# Patient Record
Sex: FEMALE | Race: BLACK OR AFRICAN AMERICAN | Employment: FULL TIME | URBAN - METROPOLITAN AREA
[De-identification: names, ages, dates, MRNs, and addresses within clinical notes are randomized per-mention and may not be internally consistent; named-entity substitution may affect disease eponyms.]

---

## 2017-06-01 ENCOUNTER — TELEPHONE (OUTPATIENT)
Dept: SURGERY | Age: 37
End: 2017-06-01

## 2017-07-14 ENCOUNTER — TELEPHONE (OUTPATIENT)
Dept: SURGERY | Age: 37
End: 2017-07-14

## 2017-09-12 RX ORDER — CYANOCOBALAMIN 1000 UG/ML
INJECTION, SOLUTION INTRAMUSCULAR; SUBCUTANEOUS
Qty: 10 VIAL | Refills: 2 | Status: SHIPPED | OUTPATIENT
Start: 2017-09-12 | End: 2018-07-20 | Stop reason: ALTCHOICE

## 2017-09-12 RX ORDER — SYRINGE WITH NEEDLE, 1 ML 25GX5/8"
SYRINGE, EMPTY DISPOSABLE MISCELLANEOUS
Qty: 12 SYRINGE | Refills: 1 | Status: SHIPPED | OUTPATIENT
Start: 2017-09-12 | End: 2018-07-20 | Stop reason: SDUPTHER

## 2018-07-20 ENCOUNTER — OFFICE VISIT (OUTPATIENT)
Dept: SURGERY | Age: 38
End: 2018-07-20

## 2018-07-20 VITALS
HEIGHT: 63 IN | HEART RATE: 64 BPM | OXYGEN SATURATION: 98 % | TEMPERATURE: 98.3 F | SYSTOLIC BLOOD PRESSURE: 115 MMHG | BODY MASS INDEX: 34.64 KG/M2 | WEIGHT: 195.5 LBS | RESPIRATION RATE: 20 BRPM | DIASTOLIC BLOOD PRESSURE: 70 MMHG

## 2018-07-20 DIAGNOSIS — Z98.84 S/P LAPAROSCOPIC SLEEVE GASTRECTOMY: ICD-10-CM

## 2018-07-20 DIAGNOSIS — E66.01 MORBID OBESITY (HCC): Primary | ICD-10-CM

## 2018-07-20 DIAGNOSIS — E53.8 VITAMIN B12 DEFICIENCY: ICD-10-CM

## 2018-07-20 DIAGNOSIS — R63.5 WEIGHT GAIN: ICD-10-CM

## 2018-07-20 DIAGNOSIS — E55.9 VITAMIN D DEFICIENCY: ICD-10-CM

## 2018-07-20 RX ORDER — CYANOCOBALAMIN 1000 UG/ML
1000 INJECTION, SOLUTION INTRAMUSCULAR; SUBCUTANEOUS
Qty: 10 ML | Refills: 1 | Status: SHIPPED | OUTPATIENT
Start: 2018-07-20 | End: 2019-10-17 | Stop reason: SDUPTHER

## 2018-07-20 NOTE — PATIENT INSTRUCTIONS
Protein first and at each meal.  Include produce (vegetables and/or fruits) at each meal.    Limit or eliminate \"filler\" foods such as breads, pasta, potatoes, rice, crackers, pretzels, and the like. Avoid eating and drinking together, there just isn't enough room! You may continue protein supplementation if needed to meet your daily protein goals. Many patients use a protein shake or bar to replace a meal.  There are a variety of protein supplements listed in your education book. Take your vitamins every day. Switch to prenatal vitamin with pregnancy  Follow up 6 weeks post partum.

## 2018-07-20 NOTE — MR AVS SNAPSHOT
[address] [facility] [id number] [phone] Patient: Ira Butt MRN: [id number] :1980 Visit Information Date & Time Provider Department Dept. Phone Encounter #  
 2018  9:40 AM Ace Cruz NP [facility] [id number] [phone] [id number] Follow-up Instructions Return in about 1 year (around 2019). Upcoming Health Maintenance Date Due Pneumococcal 19-64 Medium Risk (1 of 1 - PPSV23) 1998 DTaP/Tdap/Td series (1 - Tdap) 2000 PAP AKA CERVICAL CYTOLOGY 2000 Influenza Age 5 to Adult 2018 Allergies as of 2018  Review Complete On: 2018 By: Cortes Quick Severity Noted Reaction Type Reactions Other Medication  2013    Other (comments) HAS HAD YEAST INFECTIONS AFTER CLINDAMYCIN, PENICILLIN Current Immunizations  Never Reviewed No immunizations on file. Not reviewed this visit You Were Diagnosed With   
  
 Codes Comments Morbid obesity ([facility] [address]    -  Primary ICD-10-CM: E66.01 
ICD-9-CM: 278.01 S/P laparoscopic sleeve gastrectomy     ICD-10-CM: P28.69 ICD-9-CM: V45.86 Weight gain     ICD-10-CM: R63.5 ICD-9-CM: 783.1 Vitamin B12 deficiency     ICD-10-CM: E53.8 ICD-9-CM: 266.2 Vitamin D deficiency     ICD-10-CM: E55.9 ICD-9-CM: 268.9 BMI 34.0-34.9,adult     ICD-10-CM: S96.65 
ICD-9-CM: V85.34 Vitals BP Pulse Temp Resp Height(growth percentile) Weight(growth percentile) 115/70 (BP 1 Location: Left arm, BP Patient Position: Sitting) 64 98.3 °F (36.8 °C) (Oral) 20 5' 3\" (1.6 m) 195 lb 8 oz (88.7 kg) SpO2 BMI OB Status Smoking Status 98% 34.63 kg/m2 Having regular periods Never Smoker Vitals History BMI and BSA Data Body Mass Index Body Surface Area  
 34.63 kg/m 2 1.99 m 2 Your Updated Medication List  
  
   
 This list is accurate as of 7/20/18 10:30 AM.  Always use your most recent med list.  
  
  
  
  
 ATIVAN 0.5 mg tablet Generic drug:  LORazepam  
Take  by mouth every four (4) hours as needed. Indications: ANXIETY BD LUER-FRANCIS SYRINGE 3 mL 23 x 1\" Syrg Generic drug:  Syringe with Needle (Disp) USE EVERY MONTH intramuscularly as directed by prescriber Calcium-Cholecalciferol (D3) 600 mg(1,500mg) -400 unit Chew Take  by mouth. cholecalciferol 5,000 unit capsule Commonly known as:  VITAMIN D3 Take 1 Cap by mouth daily. Indications: VITAMIN D DEFICIENCY  
  
 cyanocobalamin 1,000 mcg/mL injection Commonly known as:  VITAMIN B12  
inject 1 milliliter intramuscularly EVERY MONTH  
  
 HAIR,SKIN & NAILS PO Take  by mouth. Natures Bounty-  gummies  
  
 multivitamin,tx-iron-minerals Tab Take  by mouth.  
  
 pantoprazole 40 mg tablet Commonly known as:  PROTONIX Take 1 Tab by mouth daily. We Performed the Following CBC W/O DIFF [06149 CPT(R)] IRON PROFILE [id number] CPT(R)] METABOLIC PANEL, COMPREHENSIVE [05983 CPT(R)] VITAMIN B12 & FOLATE [26305 CPT(R)] VITAMIN B6 [id number] CPT(R)] VITAMIN D, 25 HYDROXY [id number] CPT(R)] Follow-up Instructions Return in about 1 year (around 7/20/2019). Patient Instructions Protein first and at each meal.  Include produce (vegetables and/or fruits) at each meal.   
Limit or eliminate \"filler\" foods such as breads, pasta, potatoes, rice, crackers, pretzels, and the like. Avoid eating and drinking together, there just isn't enough room! You may continue protein supplementation if needed to meet your daily protein goals. Many patients use a protein shake or bar to replace a meal.  There are a variety of protein supplements listed in your education book. Take your vitamins every day. Switch to prenatal vitamin with pregnancy Follow up 6 weeks post partum. Introducing [facility]! [employer] introduces [employer] patient portal. Now you can access parts of your medical record, email your doctor's office, and request medication refills online. 1. In your internet browser, go to [email or web address] 2. Click on the First Time User? Click Here link in the Sign In box. You will see the New Member Sign Up page. 3. Enter your [employer] Access Code exactly as it appears below. You will not need to use this code after youve completed the sign-up process. If you do not sign up before the expiration date, you must request a new code. · [employer] Access Code: [email or web address] Expires: 10/18/2018  9:49 AM 
 
4. Enter the last four digits of your Social Security Number (xxxx) and Date of Birth (mm/dd/yyyy) as indicated and click Submit. You will be taken to the next sign-up page. 5. Create a [employer] ID. This will be your [employer] login ID and cannot be changed, so think of one that is secure and easy to remember. 6. Create a [employer] password. You can change your password at any time. 7. Enter your Password Reset Question and Answer. This can be used at a later time if you forget your password. 8. Enter your e-mail address. You will receive e-mail notification when new information is available in [address] 9. Click Sign Up. You can now view and download portions of your medical record. 10. Click the Download Summary menu link to download a portable copy of your medical information. If you have questions, please visit the Frequently Asked Questions section of the [employer] website. Remember, [employer] is NOT to be used for urgent needs. For medical emergencies, dial 911. Now available from your iPhone and Android! Please provide this summary of care documentation to your next provider. Your primary care clinician is listed as Sony Silveira. If you have any questions after today's visit, please call [phone].

## 2018-07-20 NOTE — PROGRESS NOTES
1. Have you been to the ER, urgent care clinic since your last visit? Hospitalized since your last visit? No    2. Have you seen or consulted any other health care providers outside of the [address] [place] [address] since your last visit? Include any pap smears or colon screening.  No

## 2018-07-20 NOTE — PROGRESS NOTES
Jeromy Boone is a 44 y.o. female 4 years s/p sleeve gastrectomy, down 60.5 pounds. Weight today is 195.5 pounds. Patient has gained 19 pounds since last seen in December 2016. Stated feeling out of sorts because this appointment was to help get back on track with weight loss. Yet found out she was pregnant yesterday. Denies nausea, no vomiting, no heartburn/reflux. Denies dysphagia. No fever/no chills, no shortness of breath, no chest pain, and no abdominal pain. Stated she started measuring her meals again and cut out junk food in the last 2 weeks and lost 6 pounds. Stated she was snacking chips and drinking sweet tea. Tolerating all foods. Adequate protein intake. Stated she wants to start using protein shakes again. Protein supplementation in use. Drinking at least 60 ounces of water daily. Tolerating all vitamins and medications. Hasn't had Vitamin B12 shots in at least Exercising minimal. Plans to start walking. No issues with urination. Bowel movements once daily that are formed. HPI    Review of Systems   Constitutional: Negative for chills, fever and malaise/fatigue. Respiratory: Negative for cough, sputum production and shortness of breath. Cardiovascular: Negative for chest pain and palpitations. Gastrointestinal: Negative for abdominal pain, blood in stool, constipation, diarrhea, heartburn, nausea and vomiting. Genitourinary: Negative for dysuria. Neurological: Negative for dizziness. Psychiatric/Behavioral: The patient is nervous/anxious. Physical Exam   Constitutional: She is oriented to person, place, and time. She appears well-developed and well-nourished. No distress. Cardiovascular: Normal rate, regular rhythm and normal heart sounds. Pulmonary/Chest: Effort normal and breath sounds normal. No respiratory distress. She has no wheezes. She has no rales. Abdominal: Soft. Bowel sounds are normal. She exhibits no distension. There is no tenderness.  There is no rebound and no guarding. Lap sites healed. No hernia/masses palpated. Musculoskeletal: Normal range of motion. Neurological: She is alert and oriented to person, place, and time. Skin: Skin is warm and dry. No rash noted. No erythema. Psychiatric: Her behavior is normal. Thought content normal. Her mood appears anxious. Blood pressure 115/70, pulse 64, temperature 98.3 °F (36.8 °C), temperature source Oral, resp. rate 20, height 5' 3\" (1.6 m), weight 195 lb 8 oz (88.7 kg), SpO2 98 %. ASSESSMENT and PLAN  Morbid Obesity  4 years s/p sleeve gastrectomy, down 60.5 pounds. Weight today is 195.5 pounds. Advised patient regard to diet that is high-protein, low-fat, low-sugar, limited carbohydrates. Strive for 60 grams of protein daily. If having a snack, foods that are protein or fiber rich. Still pay attention to behavioral factor and habits. No eating/drinking together, chew foods well, and portion control. Drink at least 40 ounces of non-carbonated, non-calorie beverages daily. Continue vitamin regiment daily. Advised patient to switch to prenatal with iron once daily, until follow up with GYN. Restart Vitamin B12 injection monthly. Exercise at least 3 days a week with cardiovascular and strength training. Provided patient with routine lab work slip. Advised anything concerning with labs, will contact patient prior to next visit. Patient to follow up post partum. Patient verbalized understanding and questions were answered to the best of my knowledge and ability. Nutrition educational materials were provided. Advised to call office if any questions/concerns. 27 minutes was spent with patient, greater than 50% of time spent counseling.

## 2018-08-10 ENCOUNTER — TELEPHONE (OUTPATIENT)
Dept: SURGERY | Age: 38
End: 2018-08-10

## 2018-08-10 DIAGNOSIS — E55.9 VITAMIN D DEFICIENCY: Primary | ICD-10-CM

## 2018-08-10 RX ORDER — ERGOCALCIFEROL 1.25 MG/1
50000 CAPSULE ORAL
Qty: 24 CAP | Refills: 3 | Status: SHIPPED | OUTPATIENT
Start: 2018-08-10 | End: 2019-10-17 | Stop reason: SDUPTHER

## 2018-08-14 ENCOUNTER — HOSPITAL ENCOUNTER (OUTPATIENT)
Age: 38
Setting detail: OUTPATIENT SURGERY
Discharge: HOME | End: 2018-08-14
Attending: OBSTETRICS & GYNECOLOGY | Admitting: OBSTETRICS & GYNECOLOGY
Payer: COMMERCIAL

## 2018-08-14 ENCOUNTER — ANESTHESIA (OUTPATIENT)
Dept: SURGERY | Age: 38
End: 2018-08-14
Payer: COMMERCIAL

## 2018-08-14 ENCOUNTER — ANESTHESIA EVENT (OUTPATIENT)
Dept: SURGERY | Age: 38
End: 2018-08-14
Payer: COMMERCIAL

## 2018-08-14 VITALS
SYSTOLIC BLOOD PRESSURE: 110 MMHG | RESPIRATION RATE: 13 BRPM | DIASTOLIC BLOOD PRESSURE: 61 MMHG | WEIGHT: 202.38 LBS | HEIGHT: 63 IN | TEMPERATURE: 97.5 F | OXYGEN SATURATION: 100 % | BODY MASS INDEX: 35.86 KG/M2 | HEART RATE: 56 BPM

## 2018-08-14 DIAGNOSIS — E66.01 MORBID OBESITY (HCC): Primary | ICD-10-CM

## 2018-08-14 PROCEDURE — 76060000033 HC ANESTHESIA 1 TO 1.5 HR: Performed by: OBSTETRICS & GYNECOLOGY

## 2018-08-14 PROCEDURE — 77030035029 HC NDL INSUF VERES DISP COVD -B: Performed by: OBSTETRICS & GYNECOLOGY

## 2018-08-14 PROCEDURE — 77030002933 HC SUT MCRYL J&J -A: Performed by: OBSTETRICS & GYNECOLOGY

## 2018-08-14 PROCEDURE — 77030008684 HC TU ET CUF COVD -B: Performed by: ANESTHESIOLOGY

## 2018-08-14 PROCEDURE — 77030026438 HC STYL ET INTUB CARD -A: Performed by: ANESTHESIOLOGY

## 2018-08-14 PROCEDURE — 74011250636 HC RX REV CODE- 250/636

## 2018-08-14 PROCEDURE — 77030019908 HC STETH ESOPH SIMS -A: Performed by: ANESTHESIOLOGY

## 2018-08-14 PROCEDURE — 77030039266 HC ADH SKN EXOFIN S2SG -A: Performed by: OBSTETRICS & GYNECOLOGY

## 2018-08-14 PROCEDURE — 77030013812 HC MANIP UTER LAPSCP J&J -B: Performed by: OBSTETRICS & GYNECOLOGY

## 2018-08-14 PROCEDURE — 77030008578 HC TBNG UTER SUC BUSS -A: Performed by: OBSTETRICS & GYNECOLOGY

## 2018-08-14 PROCEDURE — 76210000020 HC REC RM PH II FIRST 0.5 HR: Performed by: OBSTETRICS & GYNECOLOGY

## 2018-08-14 PROCEDURE — 77030011640 HC PAD GRND REM COVD -A: Performed by: OBSTETRICS & GYNECOLOGY

## 2018-08-14 PROCEDURE — 77030003666 HC NDL SPINAL BD -A: Performed by: OBSTETRICS & GYNECOLOGY

## 2018-08-14 PROCEDURE — 77030018836 HC SOL IRR NACL ICUM -A: Performed by: OBSTETRICS & GYNECOLOGY

## 2018-08-14 PROCEDURE — 77030034849: Performed by: OBSTETRICS & GYNECOLOGY

## 2018-08-14 PROCEDURE — 74011250636 HC RX REV CODE- 250/636: Performed by: OBSTETRICS & GYNECOLOGY

## 2018-08-14 PROCEDURE — 76010000149 HC OR TIME 1 TO 1.5 HR: Performed by: OBSTETRICS & GYNECOLOGY

## 2018-08-14 PROCEDURE — 77030007956 HC PCH ENDOSC SPEC COVD -C: Performed by: OBSTETRICS & GYNECOLOGY

## 2018-08-14 PROCEDURE — 88305 TISSUE EXAM BY PATHOLOGIST: CPT | Performed by: OBSTETRICS & GYNECOLOGY

## 2018-08-14 PROCEDURE — 77030008756 HC TU IRR SUC STRY -B: Performed by: OBSTETRICS & GYNECOLOGY

## 2018-08-14 PROCEDURE — 77030008603 HC TRCR ENDOSC EPATH J&J -C: Performed by: OBSTETRICS & GYNECOLOGY

## 2018-08-14 PROCEDURE — 74011250637 HC RX REV CODE- 250/637: Performed by: OBSTETRICS & GYNECOLOGY

## 2018-08-14 PROCEDURE — 77030034628 HC LIGASURE LAP SEAL DIV MD COVD -F: Performed by: OBSTETRICS & GYNECOLOGY

## 2018-08-14 PROCEDURE — 77030031139 HC SUT VCRL2 J&J -A: Performed by: OBSTETRICS & GYNECOLOGY

## 2018-08-14 PROCEDURE — 74011000250 HC RX REV CODE- 250

## 2018-08-14 PROCEDURE — 76210000016 HC OR PH I REC 1 TO 1.5 HR: Performed by: OBSTETRICS & GYNECOLOGY

## 2018-08-14 PROCEDURE — 74011250636 HC RX REV CODE- 250/636: Performed by: ANESTHESIOLOGY

## 2018-08-14 PROCEDURE — 74011000250 HC RX REV CODE- 250: Performed by: OBSTETRICS & GYNECOLOGY

## 2018-08-14 RX ORDER — HYDROMORPHONE HYDROCHLORIDE 1 MG/ML
0.5 INJECTION, SOLUTION INTRAMUSCULAR; INTRAVENOUS; SUBCUTANEOUS
Status: DISCONTINUED | OUTPATIENT
Start: 2018-08-14 | End: 2018-08-14 | Stop reason: HOSPADM

## 2018-08-14 RX ORDER — GLYCOPYRROLATE 0.2 MG/ML
INJECTION INTRAMUSCULAR; INTRAVENOUS AS NEEDED
Status: DISCONTINUED | OUTPATIENT
Start: 2018-08-14 | End: 2018-08-14 | Stop reason: HOSPADM

## 2018-08-14 RX ORDER — FENTANYL CITRATE 50 UG/ML
INJECTION, SOLUTION INTRAMUSCULAR; INTRAVENOUS
Status: COMPLETED
Start: 2018-08-14 | End: 2018-08-14

## 2018-08-14 RX ORDER — MIDAZOLAM HYDROCHLORIDE 1 MG/ML
INJECTION, SOLUTION INTRAMUSCULAR; INTRAVENOUS AS NEEDED
Status: DISCONTINUED | OUTPATIENT
Start: 2018-08-14 | End: 2018-08-14 | Stop reason: HOSPADM

## 2018-08-14 RX ORDER — ROCURONIUM BROMIDE 10 MG/ML
INJECTION, SOLUTION INTRAVENOUS AS NEEDED
Status: DISCONTINUED | OUTPATIENT
Start: 2018-08-14 | End: 2018-08-14 | Stop reason: HOSPADM

## 2018-08-14 RX ORDER — LIDOCAINE HYDROCHLORIDE 20 MG/ML
INJECTION, SOLUTION EPIDURAL; INFILTRATION; INTRACAUDAL; PERINEURAL AS NEEDED
Status: DISCONTINUED | OUTPATIENT
Start: 2018-08-14 | End: 2018-08-14 | Stop reason: HOSPADM

## 2018-08-14 RX ORDER — MIDAZOLAM HYDROCHLORIDE 1 MG/ML
1 INJECTION, SOLUTION INTRAMUSCULAR; INTRAVENOUS AS NEEDED
Status: DISCONTINUED | OUTPATIENT
Start: 2018-08-14 | End: 2018-08-14 | Stop reason: HOSPADM

## 2018-08-14 RX ORDER — MIDAZOLAM HYDROCHLORIDE 1 MG/ML
0.5 INJECTION, SOLUTION INTRAMUSCULAR; INTRAVENOUS
Status: DISCONTINUED | OUTPATIENT
Start: 2018-08-14 | End: 2018-08-14 | Stop reason: HOSPADM

## 2018-08-14 RX ORDER — ACETAMINOPHEN 10 MG/ML
INJECTION, SOLUTION INTRAVENOUS AS NEEDED
Status: DISCONTINUED | OUTPATIENT
Start: 2018-08-14 | End: 2018-08-14 | Stop reason: HOSPADM

## 2018-08-14 RX ORDER — ONDANSETRON 2 MG/ML
INJECTION INTRAMUSCULAR; INTRAVENOUS
Status: COMPLETED
Start: 2018-08-14 | End: 2018-08-14

## 2018-08-14 RX ORDER — FENTANYL CITRATE 50 UG/ML
INJECTION, SOLUTION INTRAMUSCULAR; INTRAVENOUS AS NEEDED
Status: DISCONTINUED | OUTPATIENT
Start: 2018-08-14 | End: 2018-08-14 | Stop reason: HOSPADM

## 2018-08-14 RX ORDER — FENTANYL CITRATE 50 UG/ML
25 INJECTION, SOLUTION INTRAMUSCULAR; INTRAVENOUS
Status: DISCONTINUED | OUTPATIENT
Start: 2018-08-14 | End: 2018-08-14 | Stop reason: HOSPADM

## 2018-08-14 RX ORDER — DIPHENHYDRAMINE HYDROCHLORIDE 50 MG/ML
12.5 INJECTION, SOLUTION INTRAMUSCULAR; INTRAVENOUS AS NEEDED
Status: DISCONTINUED | OUTPATIENT
Start: 2018-08-14 | End: 2018-08-14 | Stop reason: HOSPADM

## 2018-08-14 RX ORDER — CEFAZOLIN SODIUM 1 G/3ML
2 INJECTION, POWDER, FOR SOLUTION INTRAMUSCULAR; INTRAVENOUS ONCE
Status: COMPLETED | OUTPATIENT
Start: 2018-08-14 | End: 2018-08-14

## 2018-08-14 RX ORDER — ONDANSETRON 2 MG/ML
INJECTION INTRAMUSCULAR; INTRAVENOUS AS NEEDED
Status: DISCONTINUED | OUTPATIENT
Start: 2018-08-14 | End: 2018-08-14 | Stop reason: HOSPADM

## 2018-08-14 RX ORDER — SODIUM CHLORIDE, SODIUM LACTATE, POTASSIUM CHLORIDE, CALCIUM CHLORIDE 600; 310; 30; 20 MG/100ML; MG/100ML; MG/100ML; MG/100ML
100 INJECTION, SOLUTION INTRAVENOUS CONTINUOUS
Status: DISCONTINUED | OUTPATIENT
Start: 2018-08-14 | End: 2018-08-14 | Stop reason: HOSPADM

## 2018-08-14 RX ORDER — DEXAMETHASONE SODIUM PHOSPHATE 4 MG/ML
INJECTION, SOLUTION INTRA-ARTICULAR; INTRALESIONAL; INTRAMUSCULAR; INTRAVENOUS; SOFT TISSUE AS NEEDED
Status: DISCONTINUED | OUTPATIENT
Start: 2018-08-14 | End: 2018-08-14 | Stop reason: HOSPADM

## 2018-08-14 RX ORDER — BUPIVACAINE HYDROCHLORIDE AND EPINEPHRINE 5; 5 MG/ML; UG/ML
INJECTION, SOLUTION EPIDURAL; INTRACAUDAL; PERINEURAL AS NEEDED
Status: DISCONTINUED | OUTPATIENT
Start: 2018-08-14 | End: 2018-08-14 | Stop reason: HOSPADM

## 2018-08-14 RX ORDER — NEOSTIGMINE METHYLSULFATE 1 MG/ML
INJECTION INTRAVENOUS AS NEEDED
Status: DISCONTINUED | OUTPATIENT
Start: 2018-08-14 | End: 2018-08-14 | Stop reason: HOSPADM

## 2018-08-14 RX ORDER — FENTANYL CITRATE 50 UG/ML
50 INJECTION, SOLUTION INTRAMUSCULAR; INTRAVENOUS AS NEEDED
Status: DISCONTINUED | OUTPATIENT
Start: 2018-08-14 | End: 2018-08-14 | Stop reason: HOSPADM

## 2018-08-14 RX ORDER — HYDROMORPHONE HYDROCHLORIDE 2 MG/ML
INJECTION, SOLUTION INTRAMUSCULAR; INTRAVENOUS; SUBCUTANEOUS AS NEEDED
Status: DISCONTINUED | OUTPATIENT
Start: 2018-08-14 | End: 2018-08-14 | Stop reason: HOSPADM

## 2018-08-14 RX ORDER — ONDANSETRON 2 MG/ML
4 INJECTION INTRAMUSCULAR; INTRAVENOUS ONCE
Status: COMPLETED | OUTPATIENT
Start: 2018-08-14 | End: 2018-08-14

## 2018-08-14 RX ORDER — ONDANSETRON 2 MG/ML
4 INJECTION INTRAMUSCULAR; INTRAVENOUS AS NEEDED
Status: DISCONTINUED | OUTPATIENT
Start: 2018-08-14 | End: 2018-08-14 | Stop reason: HOSPADM

## 2018-08-14 RX ORDER — LIDOCAINE HYDROCHLORIDE 10 MG/ML
0.1 INJECTION, SOLUTION EPIDURAL; INFILTRATION; INTRACAUDAL; PERINEURAL AS NEEDED
Status: DISCONTINUED | OUTPATIENT
Start: 2018-08-14 | End: 2018-08-14 | Stop reason: HOSPADM

## 2018-08-14 RX ORDER — SODIUM CHLORIDE 0.9 % (FLUSH) 0.9 %
SYRINGE (ML) INJECTION
Status: COMPLETED
Start: 2018-08-14 | End: 2018-08-14

## 2018-08-14 RX ORDER — OXYCODONE AND ACETAMINOPHEN 5; 325 MG/1; MG/1
2 TABLET ORAL
Status: DISCONTINUED | OUTPATIENT
Start: 2018-08-14 | End: 2018-08-14 | Stop reason: HOSPADM

## 2018-08-14 RX ORDER — OXYCODONE AND ACETAMINOPHEN 5; 325 MG/1; MG/1
2 TABLET ORAL
Qty: 30 TAB | Refills: 0 | Status: SHIPPED | OUTPATIENT
Start: 2018-08-14 | End: 2021-04-14 | Stop reason: ALTCHOICE

## 2018-08-14 RX ORDER — PROPOFOL 10 MG/ML
INJECTION, EMULSION INTRAVENOUS AS NEEDED
Status: DISCONTINUED | OUTPATIENT
Start: 2018-08-14 | End: 2018-08-14 | Stop reason: HOSPADM

## 2018-08-14 RX ORDER — HYDROCODONE BITARTRATE AND ACETAMINOPHEN 5; 325 MG/1; MG/1
1 TABLET ORAL AS NEEDED
Status: DISCONTINUED | OUTPATIENT
Start: 2018-08-14 | End: 2018-08-14 | Stop reason: HOSPADM

## 2018-08-14 RX ADMIN — Medication 10 ML: at 19:36

## 2018-08-14 RX ADMIN — OXYCODONE HYDROCHLORIDE AND ACETAMINOPHEN 2 TABLET: 5; 325 TABLET ORAL at 18:14

## 2018-08-14 RX ADMIN — NEOSTIGMINE METHYLSULFATE 3 MG: 1 INJECTION INTRAVENOUS at 17:19

## 2018-08-14 RX ADMIN — CEFAZOLIN 2 G: 1 INJECTION, POWDER, FOR SOLUTION INTRAMUSCULAR; INTRAVENOUS; PARENTERAL at 16:28

## 2018-08-14 RX ADMIN — DEXAMETHASONE SODIUM PHOSPHATE 8 MG: 4 INJECTION, SOLUTION INTRA-ARTICULAR; INTRALESIONAL; INTRAMUSCULAR; INTRAVENOUS; SOFT TISSUE at 16:36

## 2018-08-14 RX ADMIN — FENTANYL CITRATE 100 MCG: 50 INJECTION, SOLUTION INTRAMUSCULAR; INTRAVENOUS at 16:13

## 2018-08-14 RX ADMIN — ACETAMINOPHEN 1000 MG: 10 INJECTION, SOLUTION INTRAVENOUS at 16:36

## 2018-08-14 RX ADMIN — PROPOFOL 200 MG: 10 INJECTION, EMULSION INTRAVENOUS at 16:13

## 2018-08-14 RX ADMIN — SODIUM CHLORIDE, POTASSIUM CHLORIDE, SODIUM LACTATE AND CALCIUM CHLORIDE: 600; 310; 30; 20 INJECTION, SOLUTION INTRAVENOUS at 16:06

## 2018-08-14 RX ADMIN — ROCURONIUM BROMIDE 40 MG: 10 INJECTION, SOLUTION INTRAVENOUS at 16:13

## 2018-08-14 RX ADMIN — ONDANSETRON 4 MG: 2 INJECTION INTRAMUSCULAR; INTRAVENOUS at 16:36

## 2018-08-14 RX ADMIN — ONDANSETRON 4 MG: 2 INJECTION, SOLUTION INTRAMUSCULAR; INTRAVENOUS at 19:36

## 2018-08-14 RX ADMIN — GLYCOPYRROLATE 0.4 MG: 0.2 INJECTION INTRAMUSCULAR; INTRAVENOUS at 17:19

## 2018-08-14 RX ADMIN — MIDAZOLAM HYDROCHLORIDE 2 MG: 1 INJECTION, SOLUTION INTRAMUSCULAR; INTRAVENOUS at 16:06

## 2018-08-14 RX ADMIN — SODIUM CHLORIDE, POTASSIUM CHLORIDE, SODIUM LACTATE AND CALCIUM CHLORIDE: 600; 310; 30; 20 INJECTION, SOLUTION INTRAVENOUS at 17:15

## 2018-08-14 RX ADMIN — ONDANSETRON 4 MG: 2 INJECTION INTRAMUSCULAR; INTRAVENOUS at 19:36

## 2018-08-14 RX ADMIN — LIDOCAINE HYDROCHLORIDE 80 MG: 20 INJECTION, SOLUTION EPIDURAL; INFILTRATION; INTRACAUDAL; PERINEURAL at 16:13

## 2018-08-14 RX ADMIN — OXYCODONE HYDROCHLORIDE AND ACETAMINOPHEN 2 TABLET: 5; 325 TABLET ORAL at 18:39

## 2018-08-14 RX ADMIN — HYDROMORPHONE HYDROCHLORIDE 0.6 MG: 2 INJECTION, SOLUTION INTRAMUSCULAR; INTRAVENOUS; SUBCUTANEOUS at 17:05

## 2018-08-14 RX ADMIN — FENTANYL CITRATE 25 MCG: 50 INJECTION, SOLUTION INTRAMUSCULAR; INTRAVENOUS at 17:54

## 2018-08-14 RX ADMIN — FENTANYL CITRATE 25 MCG: 50 INJECTION, SOLUTION INTRAMUSCULAR; INTRAVENOUS at 17:49

## 2018-08-14 NOTE — DISCHARGE INSTRUCTIONS
PATIENT DISCHARGE INSTRUCTIONS      PATIENT DISCHARGE INSTRUCTIONS    Nanci Huggins / [id number] : 1980    Admitted 2018 Discharged: 2018       · It is important that you take the medication exactly as they are prescribed. · Keep your medication in the bottles provided by the pharmacist and keep a list of the medication names, dosages, and times to be taken in your wallet. · Do not take other medications without consulting your doctor. What to do at Home    Recommended Diet: Regular Diet    Recommended Activity: Activity as tolerated and no driving for today    If you experience any of the following symptoms fever>101.4, please call [phone]        Signed By: Kristina Blank MD     2018         Narcotic-Analgesic/Acetaminophen (Percocet, Norco, Lorcet HD, Lortab 10/325) - (By mouth)   Why this medicine is used:   Relieves pain. Contact a nurse or doctor right away if you have:  · Extreme weakness, shallow breathing, slow heartbeat  · Severe confusion, lightheadedness, dizziness, fainting  · Yellow skin or eyes, dark urine or pale stools  · Severe constipation, severe stomach pain, nausea, vomiting, loss of appetite  · Sweating or cold, clammy skin     Common side effects:  · Mild constipation, nausea, vomiting  · Sleepiness, tiredness  · Itching, rash  ©  [address]  Information is for End User's use only and may not be sold, redistributed or otherwise used for commercial purposes. DISCHARGE SUMMARY from Nurse  PATIENT INSTRUCTIONS:    After general anesthesia or intravenous sedation, for 24 hours or while taking prescription Narcotics:  · Limit your activities  · Do not drive and operate hazardous machinery  · Do not make important personal or business decisions  · Do  not drink alcoholic beverages  · If you have not urinated within 8 hours after discharge, please contact your surgeon on call.     Report the following to your surgeon:  · Excessive pain, swelling, redness or odor of or around the surgical area  · Temperature over 100.5  · Nausea and vomiting lasting longer than 4 hours or if unable to take medications  · Any signs of decreased circulation or nerve impairment to extremity: change in color, persistent  numbness, tingling, coldness or increase pain  · Any questions    These are general instructions for a healthy lifestyle:    No smoking/ No tobacco products/ Avoid exposure to second hand smoke  Surgeon General's Warning:  Quitting smoking now greatly reduces serious risk to your health. Obesity, smoking, and sedentary lifestyle greatly increases your risk for illness    A healthy diet, regular physical exercise & weight monitoring are important for maintaining a healthy lifestyle    You may be retaining fluid if you have a history of heart failure or if you experience any of the following symptoms:  Weight gain of 3 pounds or more overnight or 5 pounds in a week, increased swelling in our hands or feet or shortness of breath while lying flat in bed. Please call your doctor as soon as you notice any of these symptoms; do not wait until your next office visit. Recognize signs and symptoms of STROKE:  F-face looks uneven    A-arms unable to move or move unevenly    S-speech slurred or non-existent    T-time-call 911 as soon as signs and symptoms begin-DO NOT go       Back to bed or wait to see if you get better-TIME IS BRAIN. Warning Signs of HEART ATTACK   Call 911 if you have these symptoms:   Chest discomfort. Most heart attacks involve discomfort in the center of the chest that lasts more than a few minutes, or that goes away and comes back. It can feel like uncomfortable pressure, squeezing, fullness, or pain.  Discomfort in other areas of the upper body. Symptoms can include pain or discomfort in one or both arms, the back, neck, jaw, or stomach.  Shortness of breath with or without chest discomfort.    Other signs may include breaking out in a cold sweat, nausea, or lightheadedness. Don't wait more than five minutes to call 911 - MINUTES MATTER! Fast action can save your life. Calling 911 is almost always the fastest way to get lifesaving treatment. Emergency Medical Services staff can begin treatment when they arrive -- up to an hour sooner than if someone gets to the hospital by car. The discharge information has been reviewed with the patient and caregiver. The patient and caregiver verbalized understanding. Discharge medications reviewed with the patient and caregiver and appropriate educational materials and side effects teaching were provided.

## 2018-08-14 NOTE — PERIOP NOTES
This RN continued care from phase 1 recovery through discharge. Discharge instructions reviewed with patient's mother and cousin. Provided with 1 RX for percocet. Opportunity for questions/answers given, able to verbalize understanding. Mother assisted patient to dress. Patient had another episode of emesis. Spoke with Dr Yoandy Lam and gave dose of zofran prior to discharge-see MAR. PIV removed. Denies need for restroom use prior to discharge. Escorted out for discharge home via wheelchair. Patient's mother has bag of additional belongings, discharge instructions, & prescription.

## 2018-08-14 NOTE — PERIOP NOTES
Patient had 1 episode of vomiting after attempting to take pain medications. Patient reports she has had a gastric sleeve procedure and sometimes pills get hung up. Wasted med with additional RN as witness. Will give another dose, patient requesting pills be cut in half.

## 2018-08-14 NOTE — ANESTHESIA PREPROCEDURE EVALUATION
Anesthetic History   No history of anesthetic complications            Review of Systems / Medical History  Patient summary reviewed, nursing notes reviewed and pertinent labs reviewed    Pulmonary  Within defined limits          Asthma        Neuro/Psych   Within defined limits      Psychiatric history     Cardiovascular  Within defined limits                Exercise tolerance: >4 METS     GI/Hepatic/Renal  Within defined limits   GERD           Endo/Other  Within defined limits      Morbid obesity     Other Findings   Comments: Fibromyalgia  Hx Anxiety           Physical Exam    Airway  Mallampati: II  TM Distance: 4 - 6 cm  Neck ROM: normal range of motion, short neck   Mouth opening: Normal     Cardiovascular  Regular rate and rhythm,  S1 and S2 normal,  no murmur, click, rub, or gallop             Dental  No notable dental hx       Pulmonary  Breath sounds clear to auscultation               Abdominal  GI exam deferred       Other Findings            Anesthetic Plan    ASA: 2  Anesthesia type: general    Monitoring Plan: BIS      Induction: Intravenous  Anesthetic plan and risks discussed with: Patient

## 2018-08-14 NOTE — PERIOP NOTES
Security called to bedside to lock up patients belongings. Necklace, bracelet, earrings, 2 checkbooks, 2 wallets, and one change purse given to security. Patients clothes, cell phone and pocket book (with no valuables in it) sent to PACU with belongings. Patient drove herself to the hospital, but her mother will be taking her home. Awaiting for her mother to arrive to hospital.    Patient had a snack this morning around 0900, Dr. Kvng Zepeda aware.

## 2018-08-14 NOTE — PERIOP NOTES
15:39= Dr Abdiel Lea in to see; there is currently no order for abx; received Verbal order for 2gm ancef IV. Also asked if miscarriage paperwork needs to be completed; he stated that he thinks it is not any products of conception, therefore, forms do not need to be completed. 15:42= pt concerned that ancef may give her a yeast infection. States she is normally on diflucan for that.     15;45= cousin took purse and cell phone. Belongings sent to PACU.

## 2018-08-14 NOTE — PERIOP NOTES
Handoff Report from Operating Room to PACU    Report received from Artie Ocampo CRNA and Chito Crawford RN regarding Venita Honeycutt. Surgeon(s):  Shruthi Ballard MD  And Procedure(s) (LRB):  DIAGNOSTIC LAPAROSCOPY RIGHT SALPINGECTOMY  (Right)  DILATATION AND CURETTAGE WITH SUCTION (N/A)  confirmed   with allergies and dressings discussed. Anesthesia type, drugs, patient history, complications, estimated blood loss, vital signs, intake and output, and last pain medication, lines, reversal medications and temperature were reviewed.

## 2018-08-14 NOTE — ANESTHESIA POSTPROCEDURE EVALUATION
Post-Anesthesia Evaluation and Assessment    Patient: Cindi Stephen MRN: [id number]  SSN: [id number]    YOB: 1980  Age: 44 y.o. Sex: female       Cardiovascular Function/Vital Signs  Visit Vitals    /65 (BP 1 Location: Right arm, BP Patient Position: At rest)    Pulse 61    Temp 36.4 °C (97.5 °F)    Resp 16    Ht 5' 3\" (1.6 m)    Wt 91.8 kg (202 lb 6.1 oz)    SpO2 100%    BMI 35.85 kg/m2       Patient is status post general anesthesia for Procedure(s):  DIAGNOSTIC LAPAROSCOPY RIGHT SALPINGECTOMY   DILATATION AND CURETTAGE WITH SUCTION. Nausea/Vomiting: None    Postoperative hydration reviewed and adequate. Pain:  Pain Scale 1: Numeric (0 - 10) (08/14/18 1815)  Pain Intensity 1: 7 (08/14/18 1815)   Managed    Neurological Status:   Neuro (WDL): Exceptions to WDL (08/14/18 1737)  Neuro  Neurologic State: Drowsy; Eyes open to voice (08/14/18 1737)  Orientation Level: Oriented to person;Oriented to place;Oriented to situation (08/14/18 1737)  Cognition: Follows commands (08/14/18 1737)  Speech: Clear (08/14/18 1737)  LUE Motor Response: Spontaneous  (08/14/18 1737)  LLE Motor Response: Spontaneous  (08/14/18 1737)  RUE Motor Response: Spontaneous  (08/14/18 1737)  RLE Motor Response: Spontaneous  (08/14/18 1737)   At baseline    Mental Status and Level of Consciousness: Arousable    Pulmonary Status:   O2 Device: Room air (08/14/18 1815)   Adequate oxygenation and airway patent    Complications related to anesthesia: None    Post-anesthesia assessment completed.  No concerns    Signed By: Adeel Diaz MD     August 14, 2018

## 2018-08-14 NOTE — IP AVS SNAPSHOT
[address] [facility]. 
[phone] Patient: Yoandy Davies MRN: [id number] :1980 About your hospitalization You were admitted on:  2018 You last received care in the:  [facility] [place] [facility] You were discharged on:  2018 Why you were hospitalized Your primary diagnosis was:  Not on File Follow-up Information Follow up With Details Comments Contact Info Chano Melendez MD   [address] [place] [address] Keo Simon 43 
[phone] Discharge Orders None A check wade indicates which time of day the medication should be taken. My Medications START taking these medications Instructions Each Dose to Equal  
 Morning Noon Evening Bedtime  
 oxyCODONE-acetaminophen 5-325 mg per tablet Commonly known as:  PERCOCET Your last dose was: Your next dose is: Take 2 Tabs by mouth every six (6) hours as needed. Max Daily Amount: 8 Tabs. Indications: Pain 2 Tab ASK your doctor about these medications Instructions Each Dose to Equal  
 Morning Noon Evening Bedtime ATIVAN 0.5 mg tablet Generic drug:  LORazepam  
   
Your last dose was: Your next dose is: Take  by mouth every four (4) hours as needed. Indications: ANXIETY Calcium-Cholecalciferol (D3) 600 mg(1,500mg) -400 unit Chew Your last dose was: Your next dose is: Take  by mouth. cholecalciferol 5,000 unit capsule Commonly known as:  VITAMIN D3 Your last dose was: Your next dose is: Take 1 Cap by mouth daily. Indications: VITAMIN D DEFICIENCY  
 5000 Units  
    
   
   
   
  
 cyanocobalamin 1,000 mcg/mL injection Commonly known as:  VITAMIN B-12 Your last dose was: Your next dose is: 1 mL by IntraMUSCular route every twenty-eight (28) days. Indications: Vitamin B12 Deficiency 1000 mcg  
    
   
   
   
  
 ergocalciferol 50,000 unit capsule Commonly known as:  ERGOCALCIFEROL Your last dose was: Your next dose is: Take 1 Cap by mouth every Monday and Friday. [address]30 Units HAIR,SKIN & NAILS PO Your last dose was: Your next dose is: Take  by mouth. Natures Bounty-  gummies  
     
   
   
   
  
 multivitamin,tx-iron-minerals Tab Your last dose was: Your next dose is: Take  by mouth.  
     
   
   
   
  
 pantoprazole 40 mg tablet Commonly known as:  PROTONIX Your last dose was: Your next dose is: Take 1 Tab by mouth daily. 40 mg Syringe with Needle (Disp) 3 mL 23 x 1\" Syrg Commonly known as:  BD LUER-FRANCIS SYRINGE Your last dose was: Your next dose is:    
   
   
 USE EVERY MONTH intramuscularly as directed by prescriber Where to Get Your Medications Information on where to get these meds will be given to you by the nurse or doctor. ! Ask your nurse or doctor about these medications  
  oxyCODONE-acetaminophen 5-325 mg per tablet Opioid Education Prescription Opioids: What You Need to Know: 
 
Prescription opioids can be used to help relieve moderate-to-severe pain and are often prescribed following a surgery or injury, or for certain health conditions. These medications can be an important part of treatment but also come with serious risks. Opioids are strong pain medicines. Examples include hydrocodone, oxycodone, fentanyl, and morphine. Heroin is an example of an illegal opioid. It is important to work with your health care provider to make sure you are getting the safest, most effective care. WHAT ARE THE RISKS AND SIDE EFFECTS OF OPIOID USE? Prescription opioids carry serious risks of addiction and overdose, especially with prolonged use. An opioid overdose, often marked by slow breathing, can cause sudden death. The use of prescription opioids can have a number of side effects as well, even when taken as directed. · Tolerance-meaning you might need to take more of a medication for the same pain relief · Physical dependence-meaning you have symptoms of withdrawal when the medication is stopped. Withdrawal symptoms can include nausea, sweating, chills, diarrhea, stomach cramps, and muscle aches. Withdrawal can last up to several weeks, depending on which drug you took and how long you took it. · Increased sensitivity to pain · Constipation · Nausea, vomiting, and dry mouth · Sleepiness and dizziness · Confusion · Depression · Low levels of testosterone that can result in lower sex drive, energy, and strength · Itching and sweating RISKS ARE GREATER WITH:      
· History of drug misuse, substance use disorder, or overdose · Mental health conditions (such as depression or anxiety) · Sleep apnea · Older age (72 years or older) · Pregnancy Avoid alcohol while taking prescription opioids. Also, unless specifically advised by your health care provider, medications to avoid include: · Benzodiazepines (such as Xanax or Valium) · Muscle relaxants (such as Soma or Flexeril) · Hypnotics (such as Ambien or Lunesta) · Other prescription opioids KNOW YOUR OPTIONS Talk to your health care provider about ways to manage your pain that don't involve prescription opioids. Some of these options may actually work better and have fewer risks and side effects. Options may include: 
· Pain relievers such as acetaminophen, ibuprofen, and naproxen · Some medications that are also used for depression or seizures · Physical therapy and exercise · Counseling to help patients learn how to cope better with triggers of pain and stress. · Application of heat or cold compress · Massage therapy · Relaxation techniques Be Informed Make sure you know the name of your medication, how much and how often to take it, and its potential risks & side effects. IF YOU ARE PRESCRIBED OPIOIDS FOR PAIN: 
· Never take opioids in greater amounts or more often than prescribed. Remember the goal is not to be pain-free but to manage your pain at a tolerable level. · Follow up with your primary care provider to: · Work together to create a plan on how to manage your pain. · Talk about ways to help manage your pain that don't involve prescription opioids. · Talk about any and all concerns and side effects. · Help prevent misuse and abuse. · Never sell or share prescription opioids · Help prevent misuse and abuse. · Store prescription opioids in a secure place and out of reach of others (this may include visitors, children, friends, and family). · Safely dispose of unused/unwanted prescription opioids: Find your community drug take-back program or your pharmacy mail-back program, or flush them down the toilet, following guidance from the Food and Drug Administration (www.fda.gov/Drugs/ResourcesForYou). · Visit www.cdc.gov/drugoverdose to learn about the risks of opioid abuse and overdose. · If you believe you may be struggling with addiction, tell your health care provider and ask for guidance or call [address] [place] [address] at 4-645-128-TSBY. Discharge Instructions PATIENT DISCHARGE INSTRUCTIONS PATIENT DISCHARGE INSTRUCTIONS Ira Butt / [id number] : 1980 Admitted 2018 Discharged: 2018 · It is important that you take the medication exactly as they are prescribed. · Keep your medication in the bottles provided by the pharmacist and keep a list of the medication names, dosages, and times to be taken in your wallet. · Do not take other medications without consulting your doctor. [facility] Recommended Diet: Regular Diet Recommended Activity: Activity as tolerated and no driving for today If you experience any of the following symptoms fever>101.4, please call [phone] Signed By: Halley Ames MD   
 August 14, 2018 Narcotic-Analgesic/Acetaminophen (Percocet, Norco, Lorcet HD, Lortab 10/325) - (By mouth) Why this medicine is used:  
Relieves pain. Contact a nurse or doctor right away if you have: 
· Extreme weakness, shallow breathing, slow heartbeat · Severe confusion, lightheadedness, dizziness, fainting · Yellow skin or eyes, dark urine or pale stools · Severe constipation, severe stomach pain, nausea, vomiting, loss of appetite · Sweating or cold, clammy skin Common side effects: · Mild constipation, nausea, vomiting · Sleepiness, tiredness · Itching, rash © 2017 [facility] Information is for End User's use only and may not be sold, redistributed or otherwise used for commercial purposes. DISCHARGE SUMMARY from Nurse PATIENT INSTRUCTIONS: 
 
After general anesthesia or intravenous sedation, for 24 hours or while taking prescription Narcotics: · Limit your activities · Do not drive and operate hazardous machinery · Do not make important personal or business decisions · Do  not drink alcoholic beverages · If you have not urinated within 8 hours after discharge, please contact your surgeon on call. Report the following to your surgeon: 
· Excessive pain, swelling, redness or odor of or around the surgical area · Temperature over 100.5 · Nausea and vomiting lasting longer than 4 hours or if unable to take medications · Any signs of decreased circulation or nerve impairment to extremity: change in color, persistent  numbness, tingling, coldness or increase pain · Any questions These are general instructions for a healthy lifestyle: No smoking/ No tobacco products/ Avoid exposure to second hand smoke Surgeon General's Warning:  Quitting smoking now greatly reduces serious risk to your health. Obesity, smoking, and sedentary lifestyle greatly increases your risk for illness A healthy diet, regular physical exercise & weight monitoring are important for maintaining a healthy lifestyle You may be retaining fluid if you have a history of heart failure or if you experience any of the following symptoms:  Weight gain of 3 pounds or more overnight or 5 pounds in a week, increased swelling in our hands or feet or shortness of breath while lying flat in bed. Please call your doctor as soon as you notice any of these symptoms; do not wait until your next office visit. Recognize signs and symptoms of STROKE: 
F-face looks uneven A-arms unable to move or move unevenly S-speech slurred or non-existent T-time-call 911 as soon as signs and symptoms begin-DO NOT go Back to bed or wait to see if you get better-TIME IS BRAIN. Warning Signs of HEART ATTACK Call 911 if you have these symptoms: 
? Chest discomfort. Most heart attacks involve discomfort in the center of the chest that lasts more than a few minutes, or that goes away and comes back. It can feel like uncomfortable pressure, squeezing, fullness, or pain. ? Discomfort in other areas of the upper body. Symptoms can include pain or discomfort in one or both arms, the back, neck, jaw, or stomach. ? Shortness of breath with or without chest discomfort. ? Other signs may include breaking out in a cold sweat, nausea, or lightheadedness. Don't wait more than five minutes to call [address]! Fast action can save your life. Calling 911 is almost always the fastest way to get lifesaving treatment. Emergency Medical Services staff can begin treatment when they arrive  up to an hour sooner than if someone gets to the hospital by car. The discharge information has been reviewed with the patient and caregiver. The patient and caregiver verbalized understanding. Discharge medications reviewed with the patient and caregiver and appropriate educational materials and side effects teaching were provided. Introducing [facility]! Ynes Freire introduces [employer] patient portal. Now you can access parts of your medical record, email your doctor's office, and request medication refills online. 1. In your internet browser, go to [email or web address] 2. Click on the First Time User? Click Here link in the Sign In box. You will see the New Member Sign Up page. 3. Enter your [employer] Access Code exactly as it appears below. You will not need to use this code after youve completed the sign-up process. If you do not sign up before the expiration date, you must request a new code. · [employer] Access Code: [email or web address] Expires: 10/18/2018  9:49 AM 
 
4. Enter the last four digits of your Social Security Number (xxxx) and Date of Birth (mm/dd/yyyy) as indicated and click Submit. You will be taken to the next sign-up page. 5. Create a [employer] ID. This will be your [employer] login ID and cannot be changed, so think of one that is secure and easy to remember. 6. Create a [employer] password. You can change your password at any time. 7. Enter your Password Reset Question and Answer. This can be used at a later time if you forget your password. 8. Enter your e-mail address. You will receive e-mail notification when new information is available in [address] 9. Click Sign Up. You can now view and download portions of your medical record. 10. Click the Download Summary menu link to download a portable copy of your medical information. If you have questions, please visit the Frequently Asked Questions section of the [employer] website.  Remember, [employer] is NOT to be used for urgent needs. For medical emergencies, dial 911. Now available from your iPhone and Android! Introducing Conor Aponte As a [employer] patient, I wanted to make you aware of our electronic visit tool called [employer] [employer]. [employer] allows you to connect within minutes with a medical provider 24 hours a day, seven days a week via a mobile device or tablet or logging into a secure website from your computer. You can access Conor Aponte from anywhere in the [place]. A virtual visit might be right for you when you have a simple condition and feel like you just dont want to get out of bed, or cant get away from work for an appointment, when your regular [employer] provider is not available (evenings, weekends or holidays), or when youre out of town and need minor care. Electronic visits cost only $49 and if the [employer] provider determines a prescription is needed to treat your condition, one can be electronically transmitted to a nearby pharmacy*. Please take a moment to enroll today if you have not already done so. The enrollment process is free and takes just a few minutes. To enroll, please download the [employer] andres to your tablet or phone, or visit [email or web address] to enroll on your computer. And, as an [address] [place] [address] patient with a [employer] account, the results of your visits will be scanned into your electronic medical record and your primary care provider will be able to view the scanned results. We urge you to continue to see your regular [employer] provider for your ongoing medical care. And while your primary care provider may not be the one available when you seek a Conor Aponte virtual visit, the peace of mind you get from getting a real diagnosis real time can be priceless. For more information on Conor Aponte, view our Frequently Asked Questions (FAQs) at [email or web address].  
 
Sincerely, 
 
 Khalif Dye MD 
Chief Medical Officer [employer] *:  certain medications cannot be prescribed via [employer]or Ap[facility] Providers Seen During Your Hospitalization Provider Specialty Primary office phone Maricruz Harden MD Obstetrics & Gynecology [phone] Your Primary Care Physician (PCP) Primary Care Physician Office Phone Office Fax Candace Merrill [phone] You are allergic to the following Allergen Reactions Other Medication Other (comments) HAS HAD YEAST INFECTIONS AFTER CLINDAMYCIN, PENICILLIN Recent Documentation Height Weight BMI OB Status Smoking Status 1.6 m 91.8 kg 35.85 kg/m2 Having regular periods Never Smoker Emergency Contacts Name Discharge Info Relation Home Work Mobile [address]  Parent [1] [phone] Norah Collado  Parent [1] [phone] Patient Belongings The following personal items are in your possession at time of discharge: 
  Dental Appliances: None         Home Medications: None   Jewelry: Earrings, Ring, Necklace, Bracelet (sent to security )  Clothing: Shirt, Undergarments, Pants, Footwear    Other Valuables: Other (comment) (pocket book sent to PACU with belongings, wallet given to security, cell phone sent to PACU with belongings)  Personal Items Sent to Safe: wallet, jewelry, checkbook Please provide this summary of care documentation to your next provider. Signatures-by signing, you are acknowledging that this After Visit Summary has been reviewed with you and you have received a copy. Patient Signature:  ____________________________________________________________ Date:  ____________________________________________________________  
  
Delroy Sport Provider Signature:  ____________________________________________________________ Date:  ____________________________________________________________

## 2018-08-15 NOTE — OP NOTES
[address]  OPERATIVE REPORT    Waylon Peña  MR#: [id number]  : 1980  ACCOUNT #: [de-identified]   DATE OF SERVICE: 2018    PREOPERATIVE DIAGNOSIS:  Right ectopic pregnancy with pseudo sac in the uterus    POSTOPERATIVE DIAGNOSIS:  Right ectopic pregnancy with pseudo sac in the uterus. PROCEDURES PERFORMED:  Diagnostic laparoscopy, right partial salpingectomy at the isthmic ampullary level and a suction D and C.    SURGEON:  Marlys Ram MD    ASSISTANT:       SPECIMENS REMOVED:  1. Right ovarian ectopic at the ampullary region. 2.  Pseudo sac. COMPLICATIONS:  None. DISPOSITION:  Stable. ANESTHESIA:  General.    ESTIMATED BLOOD LOSS:  Less than 10 mL. FINDINGS:  A 5 cm right inferior ectopic with hemoperitoneum, left adnexa normal size and appearance with small fibroid about 1 cm at the fundal region. URINE OUTPUT:  Is 1000 mL, clear. IMPLANTS:  .  DESCRIPTION OF PROCEDURE:  Discussed with the patient about risk, benefits and alternatives of procedure: The patient was taken to the operating room with running IV. Patient was placed in the supine position. General endotracheal intubation was administered. The patient was then repositioned in dorsal lithotomy position and prepped and draped in usual sterile fashion. A sterile speculum was placed in the patient's vagina with good visualization of the cervix. Anterior lip of the cervix was grasped using a tenaculum and the uterus sounded to 7 cm. A [address] uterine manipulator was then placed without any difficulties. The attention was then turned to the abdomen. The sterile Lindsay was placed to monitor urine output during the operative procedure. Attention was then turned to the abdomen. An infraumbilical skin incision was created with 11 blade and Veress needle placed with positive saline drop confirming intra-abdominal placement. Abdomen was insufflated with CO2 maintenance pressure of 50 mmHg. A 5 mm trocar was placed without any difficulties. Patient was placed into Trendelenburg position and camera inserted. Upon surveillance of the lower pelvis, it was noted there was a hemoperitoneum. The left tube and ovary were normal size and appearance. There was a slightly enlarged ovary with fundal fibroid 1 cm and on the right adnexa, there was a 5 cm tubal ampullary ectopic, partially ruptured. Additional 5 mm trocar was placed in the right lower quadrant and a 10 mm trocar in left lower quadrant. LigaSure was inserted and the grasper was used to grasp the right tube. Right tube was then amputated in ampulla region with ectopic. Ectopic was then placed in a laparoscopic pouch and delivered through the 10 mm port. Uterus was then copiously irrigated and cleared from all clots and debris. The salpingectomy site was found to be in excellent hemostasis. All the instruments were removed with direct visualization and 10 mL of 1% lidocaine plain injected at the trocar sites. The skin was closed using 4-0 Monocryl and 10 mm ports there was a deep stitch with a 0 Vicryl figure-of-eight stitch. Patient was taken to recovery in stable condition. She was planned to go home tonight with prescription of Percocet. There was no complication during the procedure. Sponge, instrument and needle counts were correct x2.       MD MYLES Kennedy / DEMARCUS  D: 08/14/2018 17:27     T: 08/14/2018 23:06  JOB #: [id number]

## 2018-11-12 ENCOUNTER — TELEPHONE (OUTPATIENT)
Dept: SURGERY | Age: 38
End: 2018-11-12

## 2018-11-12 NOTE — TELEPHONE ENCOUNTER
I called the patient and she said she had diarrhea for 3 days during Halloween. She said her last bm was 10 days ago. And it was hard pellets with old \"dried Blood\" in it. She said he has not had a bm since then, she said she has been taking Miralax daily, she is exercising, drinking plenty of water, eating salads, she denies any abdominal pain or nausea, she says she feels normal just can't move her bowels. She is passing gas. I encouraged her to take a Fleets enema today and OTC laxative and take as directed. I told her if this does not help to call us back. Pt in agreement.

## 2018-11-16 ENCOUNTER — TELEPHONE (OUTPATIENT)
Dept: SURGERY | Age: 38
End: 2018-11-16

## 2018-11-16 NOTE — TELEPHONE ENCOUNTER
I called the patient back after speaking to Adelaida Alvarez NP and I told her to repeat the Fleets enema and take MOM followed by 2 glasses of water and repeat in 2 hours if no results, I told her she may need to see GI about her constipation issues if this continues, I also told her to call me back if this does not help. Pt in agreement.

## 2018-11-16 NOTE — TELEPHONE ENCOUNTER
I called the patient and she said she took an enema on Mondy with a small amount of stool and she said she has been taking a stool softener daily and she is not moving her bowels, she said the stool softener is causing cramps, she wants to speak to Ирина Wilson to see what else she can take, I told her she needs to force fluids and she said she gets 48-60 ounces a day any more and she said she just can't do it. She really wants to know what to do. She said everything she has done has not worked. She wants to know if there is a prescription she can take to help her. I told her I will reach out to Ирина Wilson to see if she can call her back. Pt in agreement.

## 2019-03-25 NOTE — H&P
Gynecology History and Physical    Name: Ira Butt MRN: [id number] SSN: [id number]    YOB: 1980  Age: 44 y.o. Sex: female       Subjective:      Chief complaint:  Ectopic Pregnancy    Demarco Aldana is a 44 y.o.  female with a history of adnexal mass. Previous workup included Ultrasound which revealed 5 cm RIGHT OVARIAN MASS. Previous treatment measures included none. She is admitted for Procedure(s) (LRB):  DIAGNOSTIC LAPAROSCOPY RIGHT SALPINGECTOMY  (Right)  DILATATION AND CURETTAGE WITH SUCTION (N/A). The current method of family planning is none. OB History     No data available        Past Medical History:   Diagnosis Date    Anemia NEC     Anxiety 8/29/13    HAS HAD PANIC ATTACKS, NUMEROUS IN PAST; NONE SINCE 2012    Asthma     Calculus of kidney     Fibromyalgia     GERD (gastroesophageal reflux disease)     Hx: UTI (urinary tract infection)     patient states caused by kidney stones and being treated by PCP    Morbid obesity ([address]      Past Surgical History:   Procedure Laterality Date    HX BREAST REDUCTION  2006    HX GASTRECTOMY  9/12/13    lap sleeve gastrectomy by Dr Jordan Morton      R knee surgery 2005 ARTHROSCOPIC MENISCUS REPAIR     Social History     Occupational History    unemployed [address] [place] [address] [address] [employer]     Social History Main Topics    Smoking status: Never Smoker    Smokeless tobacco: Never Used    Alcohol use 0.0 oz/week     0 Standard drinks or equivalent per week      Comment: occasionally; 3x year    Drug use: No    Sexual activity: Not Currently     Family History   Problem Relation Age of Onset    Obesity Mother     Hypertension Father     No Known Problems Brother         Allergies   Allergen Reactions    Other Medication Other (comments)     HAS HAD YEAST INFECTIONS AFTER CLINDAMYCIN, PENICILLIN     Prior to Admission medications    Medication Sig Start Date End Date Taking?  Authorizing Provider   cyanocobalamin Return to office Patient Consult Note  Referred by: Parveen Barahona M.D.    Reason for consult: Diabetes Management Type 2    HPI:  Margy Boyer is a 71 y.o. old patient who is seeing us today for diabetes care.  This is a pleasant patient with diabetes and I appreciate the opportunity to participate in the care of this patient.    Labs of HbA1c on 3/25/19 is 8.2  Labs of 8/13/18 HbA1c is 7.2  Labs of 5/7/18 HbA1c is 6.9  2/5/18 HbA1c is 7.4  8/7/17 HbA1c is 7.1  2/12/17 HbA1c 12.9,  GFR 59  8/7/17 HbA1c is 7.2     8/25/17 GFR 54    BG Diary:3/25/2019  In the AM:  No log    Weight:She was 139 on 4/25/17 and today is 125      1. Type 2 diabetes mellitus with hyperlipidemia (HCC)  She  is now on:  1.  Trulicity 1.5 once a week (Take on Monday's)  2.  Metformin 500mg ER one in AM one in PM (Stopped on 8/13/18)     She is doing very well on this combination.  No nausea or Diarrhea.  We tried several medications before this combo for her.       ROS:   Constitutional: No night sweats.  Eyes:  No visual changes.  Cardiac: No chest pain, No palpitations or racing heart rate.  Resp: No shortness of breath, No cough,   Gi: No Diarrhea    All other systems were reviewed and were/are negative.      Past Medical History:  Patient Active Problem List    Diagnosis Date Noted   • Diabetes mellitus type II, uncontrolled (HCC) 04/25/2017   • Type 2 diabetes mellitus with hyperlipidemia (HCC) 04/25/2017   • Chronic low back pain 09/29/2016   • DDD (degenerative disc disease), lumbar 09/29/2016   • Spondylosis of lumbosacral region without myelopathy or radiculopathy 09/29/2016   • Chronic neck pain 09/29/2016   • Osteoarthritis of spine with radiculopathy, cervical region 09/29/2016   • DDD (degenerative disc disease), cervical 09/29/2016   • Muscle weakness of left upper extremity 09/29/2016   • Left hand pain 06/10/2015   • Contracture of joint of hand 01/05/2015   • Fracture of metacarpal bone 12/23/2013       Past Surgical  (VITAMIN B-12) 1,000 mcg/mL injection 1 mL by IntraMUSCular route every twenty-eight (28) days. Indications: Vitamin B12 Deficiency 7/20/18  Yes Judi Kuo NP   pantoprazole (PROTONIX) 40 mg tablet Take 1 Tab by mouth daily. 8/5/16  Yes Judi Kuo NP   Calcium-Cholecalciferol, D3, 600 mg(1,500mg) -400 unit chew Take  by mouth. Yes Historical Provider   MULTIVITAMIN WITH MINERALS (HAIR,SKIN & NAILS PO) Take  by mouth. Natures Bounty-  gummies   Yes Historical Provider   LORazepam (ATIVAN) 0.5 mg tablet Take  by mouth every four (4) hours as needed. Indications: ANXIETY   Yes Historical Provider   multivitamin,tx-iron-minerals Tab Take  by mouth. Yes Historical Provider   ergocalciferol (ERGOCALCIFEROL) 50,000 unit capsule Take 1 Cap by mouth every Monday and Friday. 8/10/18   Judi Kuo NP   Syringe with Needle, Disp, (BD LUER-FRANCIS SYRINGE) 3 mL 23 x 1\" syrg USE EVERY MONTH intramuscularly as directed by prescriber 7/20/18   Judi Kuo NP   cholecalciferol (VITAMIN D3) 5,000 unit capsule Take 1 Cap by mouth daily. Indications: VITAMIN D DEFICIENCY 8/10/16   Judi Kuo NP        Review of Systems:  A comprehensive review of systems was negative except for that written in the History of Present Illness. Objective:     Vitals:    08/14/18 1405   BP: 118/65   Pulse: 61   Resp: 18   Temp: 98.2 °F (36.8 °C)   SpO2: 100%   Weight: 91.8 kg (202 lb 6.1 oz)   Height: 5' 3\" (1.6 m)       Physical Exam:  Patient without distress. Assessment:     Active Problems:    * No active hospital problems.  *     RIGHT Ectopic Pregnancy with abnormal uterine bleeding    Plan:     Procedure(s) (LRB):  DIAGNOSTIC LAPAROSCOPY RIGHT SALPINGECTOMY  (Right)  DILATATION AND CURETTAGE WITH SUCTION (N/A)  Discussed the risks of surgery including the risks of bleeding, infection, deep vein thrombosis, and surgical injuries to internal organs including but not limited to the bowels, bladder, rectum, and female reproductive History:  Past Surgical History:   Procedure Laterality Date   • POSTERIOR CERVICAL FUSION O-ARM  11/8/2017    Procedure: CERVICAL FUSION POSTERIOR O-ARM C3-T1 INSTRUMENTED;  Surgeon: Fabiola Ferreira M.D.;  Location: SURGERY [facility];  Service: Neurosurgery   • CERVICAL LAMINECTOMY POSTERIOR  11/8/2017    Procedure: CERVICAL LAMINECTOMY POSTERIOR  C3-T1 DECOMPRESSIVE;  Surgeon: Fabiola Ferreira M.D.;  Location: SURGERY [facility];  Service: Neurosurgery   • FORAMINOTOMY Bilateral 11/8/2017    Procedure: FORAMINOTOMY;  Surgeon: Fabiola Ferreira M.D.;  Location: SURGERY [facility];  Service: Neurosurgery   • OTHER ORTHOPEDIC SURGERY  04/2013    left hand ORIF   • OTHER  1994    lumph nodes removed from rt axilla   • DILATION AND CURETTAGE  1971    for partial miscarriage   • GYN SURGERY      tubal ligation   • OTHER ORTHOPEDIC SURGERY  ?2012    Right knee arthroscopy   • OTHER ORTHOPEDIC SURGERY  2012/1974    bilat bunion removal       Allergies:  Aluminum; Asa [aspirin]; Celecoxib; Codeine; Other food; Pcn [penicillins]; Soap; Tetracycline; and Vioxx    Social History:  Social History     Social History   • Marital status: Single     Spouse name: N/A   • Number of children: N/A   • Years of education: N/A     Occupational History   • Not on file.     Social History Main Topics   • Smoking status: Never Smoker   • Smokeless tobacco: Never Used   • Alcohol use No   • Drug use: No   • Sexual activity: Not on file     Other Topics Concern   • Not on file     Social History Narrative   • No narrative on file       Family History:  No family history on file.    Medications:    Current Outpatient Prescriptions:   •  Empagliflozin 10 MG Tab, Take 1 tablet by mouth every morning with breakfast., Disp: 30 Tab, Rfl: 6  •  TRULICITY 1.5 MG/0.5ML Solution Pen-injector, Inject 0.5ml every week subcutaneously., Disp: 4 PEN, Rfl: 7  •  FREESTYLE LITE strip, USE 1 STRIP TO CHECK GLUCOSE THREE TIMES DAILY BEFORE   "MEALS, Disp: 100 Strip, Rfl: 0  •  LOVASTATIN PO, lovastatin, Disp: , Rfl:   •  Lancets Misc, 1 Applicator by Does not apply route 3 times a day., Disp: 100 Each, Rfl: 11  •  glucose blood (FREESTYLE LITE) strip, 1 Strip by Other route as needed., Disp: 100 Strip, Rfl: 6  •  Blood Glucose Monitoring Suppl (FREESTYLE FREEDOM LITE) w/Device Kit, 1 Kit by Does not apply route 3 times a day., Disp: 1 Kit, Rfl: 1  •  Lancets Misc, 1 Applicator by Does not apply route 3 times a day., Disp: 100 Each, Rfl: 11  •  bisacodyl (DULCOLAX) 10 MG Suppos, Insert 1 Suppository in rectum every 24 hours as needed (if sennosides, docusate, polyethylene glycol 3350, and/or magnesium hydroxide ineffective or not ordered)., Disp: , Rfl: 0  •  benzocaine-menthol (CEPACOL) 15-3.6 MG Lozenge, Spray 1 Lozenge in mouth/throat every 2 hours as needed (for sore throat)., Disp: , Rfl:   •  vitamin D 5000 units Tab, Take 5,000 Units by mouth every day., Disp: 60 Tab, Rfl:   •  budesonide-formoterol (SYMBICORT) 160-4.5 MCG/ACT Aerosol, Inhale 2 Puffs by mouth 2 Times a Day., Disp: , Rfl:   •  losartan (COZAAR) 50 MG Tab, Take 1 Tab by mouth every day., Disp: 30 Tab, Rfl:   •  alprazolam (XANAX) 0.25 MG Tab, Take 1 Tab by mouth 2 times a day as needed for Anxiety., Disp: 30 Tab, Rfl: 0        Physical Examination:   Vital signs: /82 (BP Location: Right arm, Patient Position: Sitting, BP Cuff Size: Adult)   Pulse (!) 106   Ht 1.651 m (5' 5\")   Wt 52.3 kg (115 lb 6.4 oz)   SpO2 97%   BMI 19.20 kg/m²   General: No distress, cooperative, well dressed and well nourished.   Eyes: No scleral icterus or discharge, No hyposphagma  ENMT: Normal on external inspection of nose, lips, No nasal drainage   Neck: No abnormal masses on inspection  Resp: Normal effort, Bilateral clear to auscultation, No wheezing, No rales  CVS: Regular rate and rhythm, S1 S2 normal, No murmur. No gallop  Extremities: No edema bilateral extremities  Neuro: Alert and " organs. The patient understands the risks; any and all questions were answered to the patient's satisfaction.     Signed By:  Naty Frias MD     August 14, 2018 oriented  Skin: No rash, No Ulcers  Psych: Normal mood and affect      Assessment and Plan:    1. Type 2 diabetes mellitus with hyperlipidemia (HCC)  She  is now on:  1.  Trulicity 1.5 once a week (Take on Monday's)  2.  Metformin 500mg ER one in AM one in PM (Stopped on 8/13/18)  3.  Start Jardiance 10mg one pill a day    Return in about 2 months (around 5/25/2019).      Thank you kindly for allowing me to participate in the diabetes care plan for this patient.    Martin Leblanc PA-C, BC-ADM  Board Certified - Advanced Diabetes Management  03/25/19    CC:   Parveen Barahona M.D.

## 2019-05-21 ENCOUNTER — TELEPHONE (OUTPATIENT)
Dept: SURGERY | Age: 39
End: 2019-05-21

## 2019-10-02 ENCOUNTER — OFFICE VISIT (OUTPATIENT)
Dept: SURGERY | Age: 39
End: 2019-10-02

## 2019-10-02 VITALS
WEIGHT: 193 LBS | RESPIRATION RATE: 16 BRPM | DIASTOLIC BLOOD PRESSURE: 89 MMHG | TEMPERATURE: 98.1 F | OXYGEN SATURATION: 97 % | HEART RATE: 86 BPM | BODY MASS INDEX: 34.2 KG/M2 | HEIGHT: 63 IN | SYSTOLIC BLOOD PRESSURE: 111 MMHG

## 2019-10-02 DIAGNOSIS — E66.01 MORBID OBESITY (HCC): Primary | ICD-10-CM

## 2019-10-02 DIAGNOSIS — Z98.84 S/P LAPAROSCOPIC SLEEVE GASTRECTOMY: ICD-10-CM

## 2019-10-02 DIAGNOSIS — E53.8 VITAMIN B12 DEFICIENCY: ICD-10-CM

## 2019-10-02 DIAGNOSIS — E55.9 VITAMIN D DEFICIENCY: ICD-10-CM

## 2019-10-02 DIAGNOSIS — D64.9 ANEMIA, UNSPECIFIED TYPE: ICD-10-CM

## 2019-10-02 NOTE — PROGRESS NOTES
1. Have you been to the ER, urgent care clinic since your last visit? Hospitalized since your last visit? No    2. Have you seen or consulted any other health care providers outside of the [address] [place] [address] since your last visit? Include any pap smears or colon screening.  No

## 2019-10-02 NOTE — PROGRESS NOTES
HISTORY OF PRESENT ILLNESS  Alma Gonzalez is a 43 y.o. female with previous Sleeve gastrectomy surgery 6 years ago . She has lost a total of 63 pounds since surgery. She  Has lost 2.5 lbs since the last ov. Body mass index is 34.19 kg/m². Ingrid Ask no nausea and no vomiting . + Acid reflux/heartburn, when she eats too much or too much red sauce. She was drink sweet tea, lemonade, soda. .. Drinking  32- 60+ ounces of water daily. ? protein intake daily. +BM's. Pt has not been back to the gym since being admitted for pancreatitis and kidney stones. Dietary recall -    Breakfast- skips or an egg, dry cereal  Lunch-vegetables, frozen dinner, pepperoni  Dinner- [place], pizza    She is snacking between meals; chips, . Vitamins:  MVI : yes  Calcium : no  B-Vit 12: yes    Patient has an advanced directive: not on file       Ms. Noemí Carlson has a reminder for a \"due or due soon\" health maintenance. I have asked that she contact her primary care provider for follow-up on this health maintenance. Co-Morbid(s)           COMORBIDITY     SLEEP APNEA                 NO        GERD  (req.meds)            YES  HYPERLIPIDEMIA            NO  HYPERTENSION              NO         DIABETES                         NO           Current Outpatient Medications:     ergocalciferol (ERGOCALCIFEROL) 50,000 unit capsule, Take 1 Cap by mouth every Monday and Friday., Disp: 24 Cap, Rfl: 3    Syringe with Needle, Disp, (BD LUER-FRANCIS SYRINGE) 3 mL 23 x 1\" syrg, USE EVERY MONTH intramuscularly as directed by prescriber, Disp: 12 Syringe, Rfl: 1    cyanocobalamin (VITAMIN B-12) 1,000 mcg/mL injection, 1 mL by IntraMUSCular route every twenty-eight (28) days. Indications: Vitamin B12 Deficiency, Disp: 10 mL, Rfl: 1    cholecalciferol (VITAMIN D3) 5,000 unit capsule, Take 1 Cap by mouth daily.  Indications: VITAMIN D DEFICIENCY, Disp: 30 Cap, Rfl: 3    Calcium-Cholecalciferol, D3, 600 mg(1,500mg) -400 unit chew, Take  by mouth., Disp: , Rfl:    MULTIVITAMIN WITH MINERALS (HAIR,SKIN & NAILS PO), Take  by mouth. Natures Bounty-  gummies, Disp: , Rfl:     multivitamin,tx-iron-minerals Tab, Take  by mouth., Disp: , Rfl:     oxyCODONE-acetaminophen (PERCOCET) 5-325 mg per tablet, Take 2 Tabs by mouth every six (6) hours as needed. Max Daily Amount: 8 Tabs. Indications: Pain, Disp: 30 Tab, Rfl: 0    pantoprazole (PROTONIX) 40 mg tablet, Take 1 Tab by mouth daily. , Disp: 30 Tab, Rfl: 3    LORazepam (ATIVAN) 0.5 mg tablet, Take  by mouth every four (4) hours as needed. Indications: ANXIETY, Disp: , Rfl:       Visit Vitals  /89 (BP 1 Location: Left arm, BP Patient Position: Sitting)   Pulse 86   Temp 98.1 °F (36.7 °C) (Oral)   Resp 16   Ht 5' 3\" (1.6 m)   Wt 193 lb (87.5 kg)   SpO2 97%   BMI 34.19 kg/m²     HPI    Review of Systems   Gastrointestinal: Negative for abdominal pain, heartburn, nausea and vomiting. Neurological: Negative for dizziness and headaches. Physical Exam   Constitutional: She is oriented to person, place, and time. She appears well-developed and well-nourished. Cardiovascular: Normal rate and regular rhythm. Exam reveals no gallop and no friction rub. No murmur heard. Pulmonary/Chest: Effort normal and breath sounds normal.   Abdominal: Soft. Bowel sounds are normal. She exhibits no distension. There is no tenderness. No masses or hernias noted    Neurological: She is alert and oriented to person, place, and time. Skin: Skin is warm and dry. Psychiatric: She has a normal mood and affect. ASSESSMENT and PLAN  Kanwal Caba is a 43 y.o. female with previous Sleeve gastrectomy surgery 6 years ago . She has lost a total of 63 pounds since surgery. She  Has lost 2.5 lbs since the last ov. Body mass index is 34.19 kg/m². George Aleksandr no nausea and no vomiting . + Acid reflux/heartburn, when she eats too much or too much red sauce. She was drink sweet tea, lemonade, soda. .. Drinking  32- 60+ ounces of water daily. ? protein intake daily. +BM's. Pt has not been back to the gym since being admitted for pancreatitis and kidney stones. She wants to get back on track and would like a refill on her B12 injections. Reviewed focusing on protein with patient and measuring meals. New book given   Re viewed vitamins. Advised to speak with her urologist to discuss Calcium intake. Resume other vitamins. Will check nutrition labs today. Recommend meeting with RD  Advised patient regard to diet that is high-protein, low-fat, low-sugar, limited carbohydrates. Strive for 50-60 grams of protein daily. If having a snack, foods that are protein or fiber rich. . No eating/drinking together, chew foods well, and portion control. Measure meals. Discussed snacking behavior and to [facility] pay attention to behavioral factor and habits. Drink at least 40-64 ounces of water or non-calorie/non-carbonated beverages daily. Continue vitamin regiment daily. Exercise at least 3 days a week with cardiovascular and strength training. Patient to follow up in 6 months. . Advised to call office if any questions/concerns. Pt verbalized understanding and questions were answered to the best of my knowledge and ability.

## 2019-10-15 LAB
25(OH)D3+25(OH)D2 SERPL-MCNC: 17.3 NG/ML (ref 30–100)
ALBUMIN SERPL-MCNC: 4 G/DL (ref 3.5–5.5)
ALBUMIN/GLOB SERPL: 1.5 {RATIO} (ref 1.2–2.2)
ALP SERPL-CCNC: 124 IU/L (ref 39–117)
ALT SERPL-CCNC: 8 IU/L (ref 0–32)
AST SERPL-CCNC: 10 IU/L (ref 0–40)
BILIRUB SERPL-MCNC: 0.2 MG/DL (ref 0–1.2)
BUN SERPL-MCNC: 8 MG/DL (ref 6–20)
BUN/CREAT SERPL: 8 (ref 9–23)
CALCIUM SERPL-MCNC: 10.7 MG/DL (ref 8.7–10.2)
CHLORIDE SERPL-SCNC: 105 MMOL/L (ref 96–106)
CO2 SERPL-SCNC: 22 MMOL/L (ref 20–29)
CREAT SERPL-MCNC: 0.95 MG/DL (ref 0.57–1)
ERYTHROCYTE [DISTWIDTH] IN BLOOD BY AUTOMATED COUNT: 14.7 % (ref 12.3–15.4)
FOLATE SERPL-MCNC: 5.3 NG/ML
GLOBULIN SER CALC-MCNC: 2.6 G/DL (ref 1.5–4.5)
GLUCOSE SERPL-MCNC: 92 MG/DL (ref 65–99)
HCT VFR BLD AUTO: 32.9 % (ref 34–46.6)
HGB BLD-MCNC: 9.4 G/DL (ref 11.1–15.9)
IRON SERPL-MCNC: 20 UG/DL (ref 27–159)
MCH RBC QN AUTO: 22.8 PG (ref 26.6–33)
MCHC RBC AUTO-ENTMCNC: 28.6 G/DL (ref 31.5–35.7)
MCV RBC AUTO: 80 FL (ref 79–97)
PLATELET # BLD AUTO: 352 X10E3/UL (ref 150–450)
POTASSIUM SERPL-SCNC: 4 MMOL/L (ref 3.5–5.2)
PROT SERPL-MCNC: 6.6 G/DL (ref 6–8.5)
RBC # BLD AUTO: 4.13 X10E6/UL (ref 3.77–5.28)
SODIUM SERPL-SCNC: 140 MMOL/L (ref 134–144)
VIT B12 SERPL-MCNC: 807 PG/ML (ref 232–1245)
WBC # BLD AUTO: 3.4 X10E3/UL (ref 3.4–10.8)

## 2019-10-17 ENCOUNTER — TELEPHONE (OUTPATIENT)
Dept: SURGERY | Age: 39
End: 2019-10-17

## 2019-10-17 DIAGNOSIS — E55.9 VITAMIN D DEFICIENCY: ICD-10-CM

## 2019-10-17 RX ORDER — CYANOCOBALAMIN 1000 UG/ML
1000 INJECTION, SOLUTION INTRAMUSCULAR; SUBCUTANEOUS
Qty: 10 ML | Refills: 1
Start: 2019-10-17

## 2019-10-17 RX ORDER — ERGOCALCIFEROL 1.25 MG/1
50000 CAPSULE ORAL
Qty: 24 CAP | Refills: 3 | Status: SHIPPED | OUTPATIENT
Start: 2019-10-18 | End: 2019-10-17 | Stop reason: SDUPTHER

## 2019-10-17 RX ORDER — ERGOCALCIFEROL 1.25 MG/1
50000 CAPSULE ORAL
Qty: 24 CAP | Refills: 3 | Status: SHIPPED | OUTPATIENT
Start: 2019-10-18 | End: 2021-04-14 | Stop reason: ALTCHOICE

## 2019-10-17 NOTE — TELEPHONE ENCOUNTER
Your Vit D is low. I am prescribing a high dose Vit D to your pharmacy. Please take as directed. You may also take Vit D 3 supplement of 2,000 iu over the couter as well. Also, your Iron is low. You need to make sure your Multivitamin has iron in it as well as take an iron supplement of 325 mg/ Vitron C daily. This may cause some constipation. I recommend Colace stool softner to help. Refilled B12 injections. REviewed labs with patient.

## 2019-10-21 ENCOUNTER — TELEPHONE (OUTPATIENT)
Dept: SURGERY | Age: 39
End: 2019-10-21

## 2019-10-21 NOTE — TELEPHONE ENCOUNTER
I called the patient back and she said she has questions about her vitamins and she is not clear what she is to be taking. I told her I phoned in her Vitamin B12 today and then she questioned me about all her other vitamins, she said she is not clear on what she was to be taking. I asked Ozzie Ha NP if she will call the patient back and go over her vitamins with her and she said she will.

## 2019-10-21 NOTE — TELEPHONE ENCOUNTER
I called the patient, no answer and voice mail has not been set up. I called the Vitamin B12 that Kitty Gupta NP had prescribed into her [facility] in [address] [facility].

## 2019-10-22 NOTE — TELEPHONE ENCOUNTER
Reviewed vitamin with patient and advised that taking gummie vitamins were not acceptable. Reviewed vitamin regemin with patient again.

## 2020-04-07 ENCOUNTER — OFFICE VISIT (OUTPATIENT)
Dept: SURGERY | Age: 40
End: 2020-04-07

## 2020-04-07 VITALS — HEIGHT: 63 IN | BODY MASS INDEX: 34.55 KG/M2 | WEIGHT: 195 LBS

## 2020-04-07 DIAGNOSIS — E66.01 MORBID OBESITY (HCC): Primary | ICD-10-CM

## 2020-04-07 DIAGNOSIS — Z98.84 S/P LAPAROSCOPIC SLEEVE GASTRECTOMY: ICD-10-CM

## 2020-04-07 DIAGNOSIS — R63.5 WEIGHT GAIN: ICD-10-CM

## 2020-04-07 RX ORDER — DOCUSATE SODIUM 100 MG/1
100 CAPSULE, LIQUID FILLED ORAL 2 TIMES DAILY
COMMUNITY
End: 2021-04-14 | Stop reason: ALTCHOICE

## 2020-04-07 RX ORDER — OMEPRAZOLE 20 MG/1
20 CAPSULE, DELAYED RELEASE ORAL DAILY
Qty: 90 CAP | Refills: 1 | Status: SHIPPED | OUTPATIENT
Start: 2020-04-07 | End: 2021-08-30

## 2020-04-07 NOTE — PROGRESS NOTES
HISTORY OF PRESENT ILLNESS  I was in the office while conducting this encounter. Consent:  She and/or her healthcare decision maker is aware that this patient-initiated Telehealth encounter is a billable service, with coverage as determined by her insurance carrier. She is aware that she may receive a bill and has provided verbal consent to proceed: Yes    This virtual visit was conducted via [employer].. Pursuant to the emergency declaration under the [facility], Critical access hospital waiver authority and the [employer] Act, this Virtual  Visit was conducted to reduce the patient's risk of exposure to COVID-19 and provide continuity of care for an established patient. Services were provided through a video synchronous discussion virtually to substitute for in-person clinic visit. Due to this being a TeleHealth evaluation, many elements of the physical examination are unable to be assessed. Total Time: minutes: 11-20 minutes. Pieter Juarez is a 35 y.o. female with previous Sleeve gastrectomy surgery on 6 years and 6 months ago. . She has lost a total of 61  pounds since surgery. She  Has gained 2 lbs.  since the last ov. Body mass index is 34.54 kg/m². Lorean Snare no nausea and no vomiting . + Acid reflux/heartburn. . Drinking  64 ounces of water daily. 60+ protein intake daily. +BM's, taking Stool softner. Pt is walking for exercise. She is trying to get back on track and is trying the liver shrinking diet. Dietary recall -    Breakfast - shake  Lunch- pork chop or shake  Dinner- shake or pork chop    She is snacking between meals; pudding, jello. Vitamins:  MVI : yes  Calcium : yes  B-Vit 12: yes    Patient has an advanced directive: not of file. Ms. Randy Rodarte has a reminder for a \"due or due soon\" health maintenance.  I have asked that she contact her primary care provider for follow-up on this health maintenance. Co-Morbid(s)                COMORBIDITY     SLEEP APNEA                 NO        GERD  (req.meds)            NO  HYPERLIPIDEMIA            NO  HYPERTENSION              NO         DIABETES                         NO           Current Outpatient Medications:     docusate sodium (COLACE) 100 mg capsule, Take 100 mg by mouth two (2) times a day., Disp: , Rfl:     cyanocobalamin (VITAMIN B-12) 1,000 mcg/mL injection, 1 mL by IntraMUSCular route every twenty-eight (28) days. Indications: inadequate vitamin B12, Disp: 10 mL, Rfl: 1    ergocalciferol (ERGOCALCIFEROL) 50,000 unit capsule, Take 1 Cap by mouth every Monday and Friday., Disp: 24 Cap, Rfl: 3    Syringe with Needle, Disp, (BD LUER-FRANCIS SYRINGE) 3 mL 23 x 1\" syrg, USE EVERY MONTH intramuscularly as directed by prescriber, Disp: 12 Syringe, Rfl: 1    cholecalciferol (VITAMIN D3) 5,000 unit capsule, Take 1 Cap by mouth daily. Indications: VITAMIN D DEFICIENCY, Disp: 30 Cap, Rfl: 3    MULTIVITAMIN WITH MINERALS (HAIR,SKIN & NAILS PO), Take  by mouth. Natures Bounty-  gummies, Disp: , Rfl:     LORazepam (ATIVAN) 0.5 mg tablet, Take  by mouth every four (4) hours as needed. Indications: ANXIETY, Disp: , Rfl:     multivitamin,tx-iron-minerals Tab, Take  by mouth., Disp: , Rfl:     oxyCODONE-acetaminophen (PERCOCET) 5-325 mg per tablet, Take 2 Tabs by mouth every six (6) hours as needed. Max Daily Amount: 8 Tabs. Indications: Pain, Disp: 30 Tab, Rfl: 0    pantoprazole (PROTONIX) 40 mg tablet, Take 1 Tab by mouth daily. , Disp: 30 Tab, Rfl: 3    Calcium-Cholecalciferol, D3, 600 mg(1,500mg) -400 unit chew, Take  by mouth., Disp: , Rfl:       Visit Vitals  Ht 5' 3\" (1.6 m)   Wt 195 lb (88.5 kg)   BMI 34.54 kg/m²     HPI    Review of Systems   Respiratory: Negative for shortness of breath. Cardiovascular: Negative for chest pain. Neurological: Negative for dizziness and headaches.        Physical Exam  Constitutional:       Appearance: Normal appearance. Pulmonary:      Effort: Pulmonary effort is normal.   Abdominal:      Palpations: Abdomen is soft. Tenderness: There is no abdominal tenderness. Neurological:      Mental Status: She is alert. ASSESSMENT and [address] Daniele is a 35 y.o. female with previous Sleeve gastrectomy surgery on 6 years and 6 months ago. . She has lost a total of 61  pounds since surgery. She  Has gained 2 lbs.  since the last ov. Body mass index is 34.54 kg/m². José Otis no nausea and no vomiting . + Acid reflux/heartburn. . Drinking  64 ounces of water daily. 60+ protein intake daily. +BM's, taking Stool softner. Pt is walking for exercise. She is trying to get back on track and is trying the liver shrinking diet. Advised that she can try the liver shrinking diet. Advised patient regard to diet that is high-protein, low-fat, low-sugar, limited carbohydrates. Strive for 50-60 grams of protein daily. If having a snack, foods that are protein or fiber rich. . No eating/drinking together, chew foods well, and portion control. Measure meals. Discussed snacking behavior and to [facility] pay attention to behavioral factor and habits. Drink at least 40-64 ounces of water or non-calorie/non-carbonated beverages daily. Continue vitamin regiment daily. Exercise at least 3 days a week with cardiovascular and strength training. Patient to follow up in 6 months. . Advised to call office if any questions/concerns.

## 2020-04-07 NOTE — PROGRESS NOTES
1. Have you been to the ER, urgent care clinic since your last visit? Hospitalized since your last visit? No    2. Have you seen or consulted any other health care providers outside of the [address] [place] [address] since your last visit? Include any pap smears or colon screening.  No

## 2020-04-07 NOTE — PATIENT INSTRUCTIONS
Eating Healthy Foods: Care Instructions Your Care Instructions Eating healthy foods can help lower your risk for disease. Healthy food gives you energy and keeps your heart strong, your brain active, your muscles working, and your bones strong. A healthy diet includes a variety of foods from the basic food groups: grains, vegetables, fruits, milk and milk products, and meat and beans. Some people may eat more of their favorite foods from only one food group and, as a result, miss getting the nutrients they need. So, it is important to pay attention not only to what you eat but also to what you are missing from your diet. You can eat a healthy, balanced diet by making a few small changes. Follow-up care is a key part of your treatment and safety. Be sure to make and go to all appointments, and call your doctor if you are having problems. It's also a good idea to know your test results and keep a list of the medicines you take. How can you care for yourself at home? Look at what you eat · Keep a food diary for a week or two and record everything you eat or drink. Track the number of servings you eat from each food group. · For a balanced diet every day, eat a variety of: 
? 6 or more ounce-equivalents of grains, such as cereals, breads, crackers, rice, or pasta, every day. An ounce-equivalent is 1 slice of bread, 1 cup of ready-to-eat cereal, or ½ cup of cooked rice, cooked pasta, or cooked cereal. 
? 2½ cups of vegetables, especially: § Dark-green vegetables such as broccoli and spinach. § Orange vegetables such as carrots and sweet potatoes. § Dry beans (such as major and kidney beans) and peas (such as lentils). ? 2 cups of fresh, frozen, or canned fruit. A small apple or 1 banana or orange equals 1 cup. ? 3 cups of nonfat or low-fat milk, yogurt, or other milk products.  
? 5½ ounces of meat and beans, such as chicken, fish, lean meat, beans, nuts, and seeds. One egg, 1 tablespoon of peanut butter, ½ ounce nuts or seeds, or ¼ cup of cooked beans equals 1 ounce of meat. · Learn how to read food labels for serving sizes and ingredients. Fast-food and convenience-food meals often contain few or no fruits or vegetables. Make sure you eat some fruits and vegetables to make the meal more nutritious. · Look at your food diary. For each food group, add up what you have eaten and then divide the total by the number of days. This will give you an idea of how much you are eating from each food group. See if you can find some ways to change your diet to make it more healthy. Start small · Do not try to make dramatic changes to your diet all at once. You might feel that you are missing out on your favorite foods and then be more likely to fail. · Start slowly, and gradually change your habits. Try some of the following: ? Use whole wheat bread instead of white bread. ? Use nonfat or low-fat milk instead of whole milk. ? Eat brown rice instead of white rice, and eat whole wheat pasta instead of white-flour pasta. ? Try low-fat cheeses and low-fat yogurt. ? Add more fruits and vegetables to meals and have them for snacks. ? Add lettuce, tomato, cucumber, and onion to sandwiches. ? Add fruit to yogurt and cereal. 
Enjoy food · You can still eat your favorite foods. You just may need to eat less of them. If your favorite foods are high in fat, salt, and sugar, limit how often you eat them, but do not cut them out entirely. · Eat a wide variety of foods. Make healthy choices when eating out · The type of restaurant you choose can help you make healthy choices. Even fast-food chains are now offering more low-fat or healthier choices on the menu. · Choose smaller portions, or take half of your meal home. · When eating out, try: ? A veggie pizza with a whole wheat crust or grilled chicken (instead of sausage or pepperoni). ? Pasta with roasted vegetables, grilled chicken, or marinara sauce instead of cream sauce. ? A vegetable wrap or grilled chicken wrap. ? Broiled or poached food instead of fried or breaded items. Make healthy choices easy · Buy packaged, prewashed, ready-to-eat fresh vegetables and fruits, such as baby carrots, salad mixes, and chopped or shredded broccoli and cauliflower. · Buy packaged, presliced fruits, such as melon or pineapple. · Choose 100% fruit or vegetable juice instead of soda. Limit juice intake to 4 to 6 oz (½ to ¾ cup) a day. · Blend low-fat yogurt, fruit juice, and canned or frozen fruit to make a smoothie for breakfast or a snack. Where can you learn more? Go to http://isaiah-rosemarie.info/ Enter T756 in the search box to learn more about \"Eating Healthy Foods: Care Instructions. \" Current as of: August 21, 2019Content Version: 12.4 © 1971-9508 [employer]. Care instructions adapted under license by [employer] (which disclaims liability or warranty for this information). If you have questions about a medical condition or this instruction, always ask your healthcare professional. [address] any warranty or liability for your use of this information.

## 2020-11-13 LAB
HBA1C MFR BLD HPLC: 5 %
LDL-C, EXTERNAL: 66

## 2020-12-14 LAB — CREATININE, EXTERNAL: 0.75

## 2021-02-08 ENCOUNTER — TELEPHONE (OUTPATIENT)
Dept: SURGERY | Age: 41
End: 2021-02-08

## 2021-04-13 ENCOUNTER — TRANSCRIBE ORDER (OUTPATIENT)
Dept: SCHEDULING | Age: 41
End: 2021-04-13

## 2021-04-13 DIAGNOSIS — E21.0 PRIMARY HYPERPARATHYROIDISM (HCC): ICD-10-CM

## 2021-04-13 DIAGNOSIS — E83.52 HYPERCALCEMIA: Primary | ICD-10-CM

## 2021-04-14 ENCOUNTER — VIRTUAL VISIT (OUTPATIENT)
Dept: ENDOCRINOLOGY | Age: 41
End: 2021-04-14
Payer: COMMERCIAL

## 2021-04-14 DIAGNOSIS — R79.89 HIGH SERUM PARATHYROID HORMONE (PTH): Primary | ICD-10-CM

## 2021-04-14 DIAGNOSIS — R79.89 HIGH SERUM PARATHYROID HORMONE (PTH): ICD-10-CM

## 2021-04-14 PROBLEM — K21.9 GERD (GASTROESOPHAGEAL REFLUX DISEASE): Status: ACTIVE | Noted: 2020-11-10

## 2021-04-14 PROBLEM — Z00.00 ENCOUNTER FOR MEDICAL EXAMINATION TO ESTABLISH CARE: Status: ACTIVE | Noted: 2020-11-13

## 2021-04-14 PROBLEM — G47.09 OTHER INSOMNIA: Status: ACTIVE | Noted: 2020-11-13

## 2021-04-14 PROBLEM — E83.52 HYPERCALCEMIA: Status: ACTIVE | Noted: 2020-12-14

## 2021-04-14 PROBLEM — Z98.84 HISTORY OF GASTRIC BYPASS: Status: ACTIVE | Noted: 2020-11-13

## 2021-04-14 PROBLEM — Z90.49 S/P LAPAROSCOPIC CHOLECYSTECTOMY: Status: ACTIVE | Noted: 2019-03-11

## 2021-04-14 PROBLEM — K85.90 PANCREATITIS: Status: ACTIVE | Noted: 2019-01-19

## 2021-04-14 PROCEDURE — 99204 OFFICE O/P NEW MOD 45 MIN: CPT | Performed by: INTERNAL MEDICINE

## 2021-04-14 RX ORDER — TRAZODONE HYDROCHLORIDE 100 MG/1
100 TABLET ORAL
COMMUNITY
Start: 2021-03-03 | End: 2021-08-30

## 2021-04-14 RX ORDER — QUETIAPINE FUMARATE 100 MG/1
200 TABLET, FILM COATED ORAL AT BEDTIME
COMMUNITY
Start: 2020-12-14

## 2021-04-14 RX ORDER — HYDROXYZINE 25 MG/1
TABLET, FILM COATED ORAL
COMMUNITY
Start: 2021-03-03

## 2021-04-14 NOTE — PROGRESS NOTES
Chief Complaint   Patient presents with   Jaimie Newell Patient     [id number]    Elevated Blood Calcium    Other     [facility] pharmacy     History of Present Illness: Alecia Mcgrath is a 38 y.o. female with a past medical hx significant for sleeve gastrectomy seen in referral from Mayelin Rose NP for discussion related to hypERcalcemia. Underwent gastric sleeve 8 years ago in 2013. Never had any problems with blood calcium prior to the surgery. No family hx of neck surgery/pituitary surgery/ MEN. At one point, was taking calcium and vitamin D. Then, when she returned to see MD, was told not to take the vit D3 anymore. 2000IU vit D + multivitamin and b12 every month. Vit D and calcium were discontinued 1 year ago. Was taking folic acid. Had nephrolithiasis age [age] as well, did not require surgery. Has had them intermittently as well, before gastric sleeve. No one else in the family with nephrolithiasis. Miscarriage in 2018- 1st trimester, kidney stones/gallbladder/gallstones in 2019. Past Medical History:   Diagnosis Date    Anemia NEC     Anxiety 8/29/13    HAS HAD PANIC ATTACKS, NUMEROUS IN PAST; NONE SINCE 2012    Asthma     Calculus of kidney     Fibromyalgia     GERD (gastroesophageal reflux disease)     Hx: UTI (urinary tract infection)     patient states caused by kidney stones and being treated by PCP    Morbid obesity ([facility] [address]      Past Surgical History:   Procedure Laterality Date    HX BREAST REDUCTION  2006    HX GASTRECTOMY  9/12/13    lap sleeve gastrectomy by Dr Nilay MUÑOZ knee surgery 2005 ARTHROSCOPIC MENISCUS REPAIR     Current Outpatient Medications   Medication Sig    traZODone (DESYREL) 100 mg tablet Take 100 mg by mouth nightly as needed.  QUEtiapine (SEROquel) 100 mg tablet Take 100 mg by mouth At bedtime.     hydrOXYzine HCL (ATARAX) 25 mg tablet Take 1-2 tabs PO PRN anxiety/panic    omeprazole (PRILOSEC) 20 mg capsule Take 1 Cap by mouth daily.  cyanocobalamin (VITAMIN B-12) 1,000 mcg/mL injection 1 mL by IntraMUSCular route every twenty-eight (28) days. Indications: inadequate vitamin B12    MULTIVITAMIN WITH MINERALS (HAIR,SKIN & NAILS PO) Take  by mouth. Natures Bounty-  gummies    multivitamin,tx-iron-minerals Tab Take  by mouth.  Syringe with Needle, Disp, (BD LUER-FRANCIS SYRINGE) 3 mL 23 x 1\" syrg USE EVERY MONTH intramuscularly as directed by prescriber     No current facility-administered medications for this visit. nature's bounty gummy biotin     Allergies   Allergen Reactions    Other Medication Other (comments)     HAS HAD YEAST INFECTIONS AFTER CLINDAMYCIN, PENICILLIN     Family History   Problem Relation Age of Onset    Obesity Mother     Hypertension Father     No Known Problems Brother        Social Hx: Deja     Review of Systems:  - Constitutional Symptoms: weight gain  - Eyes: no blurry vision or double vision  - Cardiovascular: no chest pain or palpitations  - Respiratory: no cough or shortness of breath  - Gastrointestinal: no dysphagia or abdominal pain  - Musculoskeletal: no joint pains or weakness  - Integumentary: no rashes  - Neurological: no numbness, tingling, or headaches  - Psychiatric: no depression or anxiety  - Endocrine: no heat or cold intolerance, no polyuria or polydipsia    - Physical Examination:  - - GENERAL: NCAT, Appears well nourished   - EYES: EOMI, non-icteric, no proptosis   - Ear/Nose/Throat: NCAT, no visible inflammation or masses   - CARDIOVASCULAR: no cyanosis, no visible JVD   - RESPIRATORY: respiratory effort normal without any distress or labored breathing   - MUSCULOSKELETAL: Normal ROM of neck and upper extremities observed   - SKIN: No rash on face  - NEUROLOGIC:  No facial asymmetry (Cranial nerve 7 motor function), No gaze palsy   - PSYCHIATRIC: Normal affect, Normal insight and judgement     Data Reviewed:           Assessment/Plan:  This is a very pleasant 43-year-old female with a past medical history significant for nephrolithiasis since the age of 20 prior to gastric sleeve seen in referral from Missy William NP for discussion related to suspected primary hyperparathyroidism in the setting of hypercalcemia and elevated PTH. This is an incredibly complex case given the history of gastric sleeve. Need to be very methodical in the work-up. First step is normalizing 25 hydroxy vitamin D to 30 prior to assessing repeat calcium/Phos/albumin/creatinine/parathyroid hormone levels. History of nephrolithiasis makes familial hypocalciuric hypercalcemia less likely but not impossible. Will not obtain sestamibi and/or parathyroid ultrasound until I am 924% certain of the diagnosis. 1. High serum parathyroid hormone (PTH)  -Differential diagnosis includes primary versus secondary hyperparathyroidism, labs are suggestive of primary hyperparathyroidism and there is a history of nephrolithiasis but need to be very methodical in the work-up given history of gastric sleeve and vitamin D deficiency  -Hold biotin for 3 days prior to reassessment of labs as this can lead to falsely elevated PTH  - VITAMIN D, 25 HYDROXY; Future, once this is above 30 will proceed with subsequent labs including a 24-hour urinary calcium level  -No family history suggestive of MEN    Copy sent to: Missy William NP     Return to care pending 25 hydroxy vitamin D level      Jaspreet Miller is a 38 y.o. female being evaluated by a Virtual Visit (video visit) encounter to address concerns as mentioned above. A caregiver was present when appropriate. Due to this being a TeleHealth encounter (During [facility] public health emergency), evaluation of the following organ systems was limited:     Vitals/Constitutional/EENT/Resp/CV/GI//MS/Neuro/Skin/Heme-Lymph-Imm.   Pursuant to the emergency declaration under the [facility] waiver authority and the Coronavirus Preparedness and Response Supplemental Appropriations Act, this Virtual Visit was conducted with patient's (and/or legal guardian's) consent, to reduce the risk of exposure to COVID-19 and provide necessary medical care. Services were provided through a video synchronous discussion virtually to substitute for in-person encounter. --Gerson Vanegas MD on 4/14/2021 at 1:14 PM    An electronic signature was used to authenticate this note.

## 2021-05-17 ENCOUNTER — TRANSCRIBE ORDER (OUTPATIENT)
Dept: SCHEDULING | Age: 41
End: 2021-05-17

## 2021-05-17 DIAGNOSIS — E83.52 HYPERCALCEMIA: Primary | ICD-10-CM

## 2021-05-17 DIAGNOSIS — E21.0 PRIMARY HYPERPARATHYROIDISM (HCC): ICD-10-CM

## 2021-05-26 ENCOUNTER — HOSPITAL ENCOUNTER (OUTPATIENT)
Dept: NUCLEAR MEDICINE | Age: 41
Discharge: HOME | End: 2021-05-26
Attending: SPECIALIST
Payer: COMMERCIAL

## 2021-05-26 ENCOUNTER — HOSPITAL ENCOUNTER (OUTPATIENT)
Dept: ULTRASOUND IMAGING | Age: 41
Discharge: HOME | End: 2021-05-26
Attending: SPECIALIST
Payer: COMMERCIAL

## 2021-05-26 DIAGNOSIS — E83.52 HYPERCALCEMIA: ICD-10-CM

## 2021-05-26 DIAGNOSIS — E21.0 PRIMARY HYPERPARATHYROIDISM (HCC): ICD-10-CM

## 2021-05-26 PROCEDURE — 78070 PARATHYROID PLANAR IMAGING: CPT

## 2021-05-26 PROCEDURE — 76536 US EXAM OF HEAD AND NECK: CPT

## 2021-08-30 ENCOUNTER — TRANSCRIBE ORDER (OUTPATIENT)
Dept: REGISTRATION | Age: 41
End: 2021-08-30

## 2021-08-30 ENCOUNTER — HOSPITAL ENCOUNTER (OUTPATIENT)
Dept: PREADMISSION TESTING | Age: 41
Discharge: HOME | End: 2021-08-30
Payer: COMMERCIAL

## 2021-08-30 VITALS
SYSTOLIC BLOOD PRESSURE: 118 MMHG | DIASTOLIC BLOOD PRESSURE: 77 MMHG | WEIGHT: 242.51 LBS | HEART RATE: 62 BPM | BODY MASS INDEX: 42.97 KG/M2 | TEMPERATURE: 98 F | HEIGHT: 63 IN

## 2021-08-30 DIAGNOSIS — Z01.812 PRE-PROCEDURE LAB EXAM: Primary | ICD-10-CM

## 2021-08-30 DIAGNOSIS — Z01.812 PRE-PROCEDURE LAB EXAM: ICD-10-CM

## 2021-08-30 LAB
BASOPHILS # BLD: 0 K/UL (ref 0–0.1)
BASOPHILS NFR BLD: 0 % (ref 0–1)
DIFFERENTIAL METHOD BLD: ABNORMAL
EOSINOPHIL # BLD: 0 K/UL (ref 0–0.4)
EOSINOPHIL NFR BLD: 0 % (ref 0–7)
ERYTHROCYTE [DISTWIDTH] IN BLOOD BY AUTOMATED COUNT: 14.6 % (ref 11.5–14.5)
HCT VFR BLD AUTO: 32.8 % (ref 35–47)
HGB BLD-MCNC: 10.3 G/DL (ref 11.5–16)
IMM GRANULOCYTES # BLD AUTO: 0 K/UL (ref 0–0.04)
IMM GRANULOCYTES NFR BLD AUTO: 0 % (ref 0–0.5)
LYMPHOCYTES # BLD: 1.8 K/UL (ref 0.8–3.5)
LYMPHOCYTES NFR BLD: 40 % (ref 12–49)
MCH RBC QN AUTO: 26.5 PG (ref 26–34)
MCHC RBC AUTO-ENTMCNC: 31.4 G/DL (ref 30–36.5)
MCV RBC AUTO: 84.3 FL (ref 80–99)
MONOCYTES # BLD: 0.3 K/UL (ref 0–1)
MONOCYTES NFR BLD: 7 % (ref 5–13)
NEUTS SEG # BLD: 2.3 K/UL (ref 1.8–8)
NEUTS SEG NFR BLD: 53 % (ref 32–75)
NRBC # BLD: 0 K/UL (ref 0–0.01)
NRBC BLD-RTO: 0 PER 100 WBC
PLATELET # BLD AUTO: 343 K/UL (ref 150–400)
PMV BLD AUTO: 10.1 FL (ref 8.9–12.9)
RBC # BLD AUTO: 3.89 M/UL (ref 3.8–5.2)
WBC # BLD AUTO: 4.5 K/UL (ref 3.6–11)

## 2021-08-30 PROCEDURE — 85025 COMPLETE CBC W/AUTO DIFF WBC: CPT

## 2021-08-30 PROCEDURE — 36415 COLL VENOUS BLD VENIPUNCTURE: CPT

## 2021-08-30 PROCEDURE — U0003 INFECTIOUS AGENT DETECTION BY NUCLEIC ACID (DNA OR RNA); SEVERE ACUTE RESPIRATORY SYNDROME CORONAVIRUS 2 (SARS-COV-2) (CORONAVIRUS DISEASE [COVID-19]), AMPLIFIED PROBE TECHNIQUE, MAKING USE OF HIGH THROUGHPUT TECHNOLOGIES AS DESCRIBED BY CMS-2020-01-R: HCPCS

## 2021-08-30 RX ORDER — FLUOXETINE 20 MG/1
20 TABLET ORAL DAILY
COMMUNITY

## 2021-08-30 RX ORDER — ACETAMINOPHEN 325 MG/1
650 TABLET ORAL
COMMUNITY

## 2021-08-30 NOTE — PERIOP NOTES
PATIENT GIVEN SURGICAL SITE INFECTION FAQ HANDOUT AND HAND WASHING TIP SHEET. PREOP INSTRUCTIONS REVIEWED AND PATIENT VERBALIZES UNDERSTANDING OF INSTRUCTIONS. PATIENT HAS BEEN GIVEN THE OPPORTUNITY TO ASK ADDITIONAL QUESTIONS. GAVE PATIENT 2 BOTTLES OF CHG CLEANSER AND INSTRUCTIONS., PATIENT VERBALIZED UNDERSTANDING.

## 2021-08-31 LAB
SARS-COV-2, XPLCVT: NOT DETECTED
SOURCE, COVRS: NORMAL

## 2021-09-02 ENCOUNTER — HOSPITAL ENCOUNTER (OUTPATIENT)
Age: 41
Setting detail: OBSERVATION
Discharge: HOME | End: 2021-09-03
Attending: OTOLARYNGOLOGY | Admitting: OTOLARYNGOLOGY
Payer: COMMERCIAL

## 2021-09-02 ENCOUNTER — ANESTHESIA (OUTPATIENT)
Dept: SURGERY | Age: 41
End: 2021-09-02
Payer: COMMERCIAL

## 2021-09-02 ENCOUNTER — ANESTHESIA EVENT (OUTPATIENT)
Dept: SURGERY | Age: 41
End: 2021-09-02
Payer: COMMERCIAL

## 2021-09-02 DIAGNOSIS — E83.52 HYPERCALCEMIA: Primary | ICD-10-CM

## 2021-09-02 PROBLEM — Z98.890 STATUS POST SURGERY: Status: ACTIVE | Noted: 2021-09-02

## 2021-09-02 LAB
HCG UR QL: NEGATIVE
INTRA-OP PTH, IPTHRT: 36.1 PG/ML (ref 18.4–88)
INTRA-OP PTH, IPTHRT: 36.6 PG/ML (ref 18.4–88)
INTRA-OP PTH, IPTHRT: 378.9 PG/ML (ref 18.4–88)
PTH-INTACT IO % DIF SERPL: NORMAL %
SPECIMEN DRAWN SERPL: 1440
SPECIMEN DRAWN SERPL: 1656
SPECIMEN DRAWN SERPL: 1657

## 2021-09-02 PROCEDURE — 77030011267 HC ELECTRD BLD COVD -A: Performed by: OTOLARYNGOLOGY

## 2021-09-02 PROCEDURE — 77030002996 HC SUT SLK J&J -A: Performed by: OTOLARYNGOLOGY

## 2021-09-02 PROCEDURE — 74011250636 HC RX REV CODE- 250/636: Performed by: ANESTHESIOLOGY

## 2021-09-02 PROCEDURE — 74011000250 HC RX REV CODE- 250: Performed by: ANESTHESIOLOGY

## 2021-09-02 PROCEDURE — 99218 HC RM OBSERVATION: CPT

## 2021-09-02 PROCEDURE — 81025 URINE PREGNANCY TEST: CPT

## 2021-09-02 PROCEDURE — 76060000035 HC ANESTHESIA 2 TO 2.5 HR: Performed by: OTOLARYNGOLOGY

## 2021-09-02 PROCEDURE — 2709999900 HC NON-CHARGEABLE SUPPLY: Performed by: OTOLARYNGOLOGY

## 2021-09-02 PROCEDURE — 76210000006 HC OR PH I REC 0.5 TO 1 HR: Performed by: OTOLARYNGOLOGY

## 2021-09-02 PROCEDURE — 76010000162 HC OR TIME 1.5 TO 2 HR INTENSV-TIER 1: Performed by: OTOLARYNGOLOGY

## 2021-09-02 PROCEDURE — 77030040356 HC CORD BPLR FRCP COVD -A: Performed by: OTOLARYNGOLOGY

## 2021-09-02 PROCEDURE — 36415 COLL VENOUS BLD VENIPUNCTURE: CPT

## 2021-09-02 PROCEDURE — 74011250636 HC RX REV CODE- 250/636: Performed by: NURSE ANESTHETIST, CERTIFIED REGISTERED

## 2021-09-02 PROCEDURE — 77030040361 HC SLV COMPR DVT MDII -B: Performed by: OTOLARYNGOLOGY

## 2021-09-02 PROCEDURE — 74011250637 HC RX REV CODE- 250/637: Performed by: OTOLARYNGOLOGY

## 2021-09-02 PROCEDURE — 77030002916 HC SUT ETHLN J&J -A: Performed by: OTOLARYNGOLOGY

## 2021-09-02 PROCEDURE — 74011000250 HC RX REV CODE- 250: Performed by: NURSE ANESTHETIST, CERTIFIED REGISTERED

## 2021-09-02 PROCEDURE — 88305 TISSUE EXAM BY PATHOLOGIST: CPT

## 2021-09-02 PROCEDURE — 77030038552 HC DRN WND MDII -A: Performed by: OTOLARYNGOLOGY

## 2021-09-02 PROCEDURE — 77030026438 HC STYL ET INTUB CARD -A: Performed by: NURSE ANESTHETIST, CERTIFIED REGISTERED

## 2021-09-02 PROCEDURE — 88331 PATH CONSLTJ SURG 1 BLK 1SPC: CPT

## 2021-09-02 PROCEDURE — 74011250636 HC RX REV CODE- 250/636: Performed by: OTOLARYNGOLOGY

## 2021-09-02 PROCEDURE — 77030002933 HC SUT MCRYL J&J -A: Performed by: OTOLARYNGOLOGY

## 2021-09-02 PROCEDURE — 83970 ASSAY OF PARATHORMONE: CPT

## 2021-09-02 PROCEDURE — 77030008684 HC TU ET CUF COVD -B: Performed by: NURSE ANESTHETIST, CERTIFIED REGISTERED

## 2021-09-02 PROCEDURE — 77030031139 HC SUT VCRL2 J&J -A: Performed by: OTOLARYNGOLOGY

## 2021-09-02 RX ORDER — SODIUM CHLORIDE 0.9 % (FLUSH) 0.9 %
5-40 SYRINGE (ML) INJECTION EVERY 8 HOURS
Status: DISCONTINUED | OUTPATIENT
Start: 2021-09-02 | End: 2021-09-04 | Stop reason: HOSPADM

## 2021-09-02 RX ORDER — FERROUS SULFATE, DRIED 160(50) MG
2 TABLET, EXTENDED RELEASE ORAL
Status: DISCONTINUED | OUTPATIENT
Start: 2021-09-02 | End: 2021-09-04 | Stop reason: HOSPADM

## 2021-09-02 RX ORDER — SODIUM CHLORIDE, SODIUM LACTATE, POTASSIUM CHLORIDE, CALCIUM CHLORIDE 600; 310; 30; 20 MG/100ML; MG/100ML; MG/100ML; MG/100ML
75 INJECTION, SOLUTION INTRAVENOUS CONTINUOUS
Status: DISPENSED | OUTPATIENT
Start: 2021-09-02 | End: 2021-09-03

## 2021-09-02 RX ORDER — MORPHINE SULFATE 2 MG/ML
2 INJECTION, SOLUTION INTRAMUSCULAR; INTRAVENOUS
Status: DISCONTINUED | OUTPATIENT
Start: 2021-09-02 | End: 2021-09-04 | Stop reason: HOSPADM

## 2021-09-02 RX ORDER — HYDROXYZINE HYDROCHLORIDE 10 MG/1
10 TABLET, FILM COATED ORAL
Status: DISCONTINUED | OUTPATIENT
Start: 2021-09-02 | End: 2021-09-04 | Stop reason: HOSPADM

## 2021-09-02 RX ORDER — SODIUM CHLORIDE 0.9 % (FLUSH) 0.9 %
5-40 SYRINGE (ML) INJECTION AS NEEDED
Status: DISCONTINUED | OUTPATIENT
Start: 2021-09-02 | End: 2021-09-02 | Stop reason: HOSPADM

## 2021-09-02 RX ORDER — SODIUM CHLORIDE, SODIUM LACTATE, POTASSIUM CHLORIDE, CALCIUM CHLORIDE 600; 310; 30; 20 MG/100ML; MG/100ML; MG/100ML; MG/100ML
75 INJECTION, SOLUTION INTRAVENOUS CONTINUOUS
Status: DISCONTINUED | OUTPATIENT
Start: 2021-09-02 | End: 2021-09-02 | Stop reason: HOSPADM

## 2021-09-02 RX ORDER — FENTANYL CITRATE 50 UG/ML
25 INJECTION, SOLUTION INTRAMUSCULAR; INTRAVENOUS
Status: COMPLETED | OUTPATIENT
Start: 2021-09-02 | End: 2021-09-02

## 2021-09-02 RX ORDER — DEXMEDETOMIDINE HYDROCHLORIDE 100 UG/ML
INJECTION, SOLUTION INTRAVENOUS AS NEEDED
Status: DISCONTINUED | OUTPATIENT
Start: 2021-09-02 | End: 2021-09-02 | Stop reason: HOSPADM

## 2021-09-02 RX ORDER — OXYCODONE AND ACETAMINOPHEN 5; 325 MG/1; MG/1
1 TABLET ORAL AS NEEDED
Status: DISCONTINUED | OUTPATIENT
Start: 2021-09-02 | End: 2021-09-02 | Stop reason: HOSPADM

## 2021-09-02 RX ORDER — MIDAZOLAM HYDROCHLORIDE 1 MG/ML
0.5 INJECTION, SOLUTION INTRAMUSCULAR; INTRAVENOUS
Status: DISCONTINUED | OUTPATIENT
Start: 2021-09-02 | End: 2021-09-02 | Stop reason: HOSPADM

## 2021-09-02 RX ORDER — GLYCOPYRROLATE 0.2 MG/ML
INJECTION INTRAMUSCULAR; INTRAVENOUS AS NEEDED
Status: DISCONTINUED | OUTPATIENT
Start: 2021-09-02 | End: 2021-09-02 | Stop reason: HOSPADM

## 2021-09-02 RX ORDER — ONDANSETRON 2 MG/ML
4 INJECTION INTRAMUSCULAR; INTRAVENOUS
Status: DISCONTINUED | OUTPATIENT
Start: 2021-09-02 | End: 2021-09-04 | Stop reason: HOSPADM

## 2021-09-02 RX ORDER — ROCURONIUM BROMIDE 10 MG/ML
INJECTION, SOLUTION INTRAVENOUS AS NEEDED
Status: DISCONTINUED | OUTPATIENT
Start: 2021-09-02 | End: 2021-09-02 | Stop reason: HOSPADM

## 2021-09-02 RX ORDER — CEFAZOLIN SODIUM 1 G/3ML
INJECTION, POWDER, FOR SOLUTION INTRAMUSCULAR; INTRAVENOUS AS NEEDED
Status: DISCONTINUED | OUTPATIENT
Start: 2021-09-02 | End: 2021-09-02 | Stop reason: HOSPADM

## 2021-09-02 RX ORDER — SODIUM CHLORIDE 9 MG/ML
50 INJECTION, SOLUTION INTRAVENOUS CONTINUOUS
Status: DISPENSED | OUTPATIENT
Start: 2021-09-02 | End: 2021-09-03

## 2021-09-02 RX ORDER — HYDROMORPHONE HYDROCHLORIDE 2 MG/ML
INJECTION, SOLUTION INTRAMUSCULAR; INTRAVENOUS; SUBCUTANEOUS AS NEEDED
Status: DISCONTINUED | OUTPATIENT
Start: 2021-09-02 | End: 2021-09-02 | Stop reason: HOSPADM

## 2021-09-02 RX ORDER — SODIUM CHLORIDE 0.9 % (FLUSH) 0.9 %
5-40 SYRINGE (ML) INJECTION EVERY 8 HOURS
Status: DISCONTINUED | OUTPATIENT
Start: 2021-09-02 | End: 2021-09-02 | Stop reason: HOSPADM

## 2021-09-02 RX ORDER — OXYCODONE AND ACETAMINOPHEN 5; 325 MG/1; MG/1
1 TABLET ORAL
Status: DISCONTINUED | OUTPATIENT
Start: 2021-09-02 | End: 2021-09-04 | Stop reason: HOSPADM

## 2021-09-02 RX ORDER — FENTANYL CITRATE 50 UG/ML
50 INJECTION, SOLUTION INTRAMUSCULAR; INTRAVENOUS AS NEEDED
Status: DISCONTINUED | OUTPATIENT
Start: 2021-09-02 | End: 2021-09-02 | Stop reason: HOSPADM

## 2021-09-02 RX ORDER — SODIUM CHLORIDE 9 MG/ML
50 INJECTION, SOLUTION INTRAVENOUS CONTINUOUS
Status: DISCONTINUED | OUTPATIENT
Start: 2021-09-02 | End: 2021-09-02 | Stop reason: HOSPADM

## 2021-09-02 RX ORDER — ONDANSETRON 2 MG/ML
4 INJECTION INTRAMUSCULAR; INTRAVENOUS AS NEEDED
Status: DISCONTINUED | OUTPATIENT
Start: 2021-09-02 | End: 2021-09-02 | Stop reason: HOSPADM

## 2021-09-02 RX ORDER — SUCCINYLCHOLINE CHLORIDE 20 MG/ML
INJECTION INTRAMUSCULAR; INTRAVENOUS AS NEEDED
Status: DISCONTINUED | OUTPATIENT
Start: 2021-09-02 | End: 2021-09-02 | Stop reason: HOSPADM

## 2021-09-02 RX ORDER — MIDAZOLAM HYDROCHLORIDE 1 MG/ML
INJECTION, SOLUTION INTRAMUSCULAR; INTRAVENOUS AS NEEDED
Status: DISCONTINUED | OUTPATIENT
Start: 2021-09-02 | End: 2021-09-02 | Stop reason: HOSPADM

## 2021-09-02 RX ORDER — SODIUM CHLORIDE 0.9 % (FLUSH) 0.9 %
5-40 SYRINGE (ML) INJECTION AS NEEDED
Status: DISCONTINUED | OUTPATIENT
Start: 2021-09-02 | End: 2021-09-04 | Stop reason: HOSPADM

## 2021-09-02 RX ORDER — LIDOCAINE HYDROCHLORIDE 20 MG/ML
INJECTION, SOLUTION EPIDURAL; INFILTRATION; INTRACAUDAL; PERINEURAL AS NEEDED
Status: DISCONTINUED | OUTPATIENT
Start: 2021-09-02 | End: 2021-09-02 | Stop reason: HOSPADM

## 2021-09-02 RX ORDER — DEXAMETHASONE SODIUM PHOSPHATE 4 MG/ML
INJECTION, SOLUTION INTRA-ARTICULAR; INTRALESIONAL; INTRAMUSCULAR; INTRAVENOUS; SOFT TISSUE AS NEEDED
Status: DISCONTINUED | OUTPATIENT
Start: 2021-09-02 | End: 2021-09-02 | Stop reason: HOSPADM

## 2021-09-02 RX ORDER — PROPOFOL 10 MG/ML
INJECTION, EMULSION INTRAVENOUS AS NEEDED
Status: DISCONTINUED | OUTPATIENT
Start: 2021-09-02 | End: 2021-09-02 | Stop reason: HOSPADM

## 2021-09-02 RX ORDER — DIPHENHYDRAMINE HYDROCHLORIDE 50 MG/ML
12.5 INJECTION, SOLUTION INTRAMUSCULAR; INTRAVENOUS AS NEEDED
Status: DISCONTINUED | OUTPATIENT
Start: 2021-09-02 | End: 2021-09-02 | Stop reason: HOSPADM

## 2021-09-02 RX ORDER — LIDOCAINE HYDROCHLORIDE 10 MG/ML
0.1 INJECTION, SOLUTION EPIDURAL; INFILTRATION; INTRACAUDAL; PERINEURAL AS NEEDED
Status: DISCONTINUED | OUTPATIENT
Start: 2021-09-02 | End: 2021-09-02 | Stop reason: HOSPADM

## 2021-09-02 RX ORDER — NEOSTIGMINE METHYLSULFATE 1 MG/ML
INJECTION, SOLUTION INTRAVENOUS AS NEEDED
Status: DISCONTINUED | OUTPATIENT
Start: 2021-09-02 | End: 2021-09-02 | Stop reason: HOSPADM

## 2021-09-02 RX ORDER — FLUOXETINE HYDROCHLORIDE 20 MG/1
20 CAPSULE ORAL DAILY
Status: DISCONTINUED | OUTPATIENT
Start: 2021-09-03 | End: 2021-09-04 | Stop reason: HOSPADM

## 2021-09-02 RX ORDER — QUETIAPINE FUMARATE 100 MG/1
200 TABLET, FILM COATED ORAL
Status: DISCONTINUED | OUTPATIENT
Start: 2021-09-02 | End: 2021-09-04 | Stop reason: HOSPADM

## 2021-09-02 RX ORDER — HYDROMORPHONE HYDROCHLORIDE 1 MG/ML
0.2 INJECTION, SOLUTION INTRAMUSCULAR; INTRAVENOUS; SUBCUTANEOUS
Status: DISCONTINUED | OUTPATIENT
Start: 2021-09-02 | End: 2021-09-02 | Stop reason: HOSPADM

## 2021-09-02 RX ORDER — ONDANSETRON 2 MG/ML
INJECTION INTRAMUSCULAR; INTRAVENOUS AS NEEDED
Status: DISCONTINUED | OUTPATIENT
Start: 2021-09-02 | End: 2021-09-02 | Stop reason: HOSPADM

## 2021-09-02 RX ORDER — FENTANYL CITRATE 50 UG/ML
INJECTION, SOLUTION INTRAMUSCULAR; INTRAVENOUS AS NEEDED
Status: DISCONTINUED | OUTPATIENT
Start: 2021-09-02 | End: 2021-09-02 | Stop reason: HOSPADM

## 2021-09-02 RX ORDER — MIDAZOLAM HYDROCHLORIDE 1 MG/ML
1 INJECTION, SOLUTION INTRAMUSCULAR; INTRAVENOUS AS NEEDED
Status: DISCONTINUED | OUTPATIENT
Start: 2021-09-02 | End: 2021-09-02 | Stop reason: HOSPADM

## 2021-09-02 RX ORDER — MORPHINE SULFATE 2 MG/ML
2 INJECTION, SOLUTION INTRAMUSCULAR; INTRAVENOUS
Status: DISCONTINUED | OUTPATIENT
Start: 2021-09-02 | End: 2021-09-02 | Stop reason: HOSPADM

## 2021-09-02 RX ORDER — EPHEDRINE SULFATE/0.9% NACL/PF 50 MG/5 ML
SYRINGE (ML) INTRAVENOUS AS NEEDED
Status: DISCONTINUED | OUTPATIENT
Start: 2021-09-02 | End: 2021-09-02 | Stop reason: HOSPADM

## 2021-09-02 RX ADMIN — GLYCOPYRROLATE 0.4 MG: 0.2 INJECTION, SOLUTION INTRAMUSCULAR; INTRAVENOUS at 17:17

## 2021-09-02 RX ADMIN — FENTANYL CITRATE 25 MCG: 0.05 INJECTION, SOLUTION INTRAMUSCULAR; INTRAVENOUS at 18:00

## 2021-09-02 RX ADMIN — SUCCINYLCHOLINE CHLORIDE 120 MG: 20 INJECTION, SOLUTION INTRAMUSCULAR; INTRAVENOUS at 15:36

## 2021-09-02 RX ADMIN — NEOSTIGMINE METHYLSULFATE 2 MG: 1 INJECTION, SOLUTION INTRAVENOUS at 17:17

## 2021-09-02 RX ADMIN — FENTANYL CITRATE 25 MCG: 0.05 INJECTION, SOLUTION INTRAMUSCULAR; INTRAVENOUS at 17:40

## 2021-09-02 RX ADMIN — SODIUM CHLORIDE: 900 INJECTION, SOLUTION INTRAVENOUS at 16:43

## 2021-09-02 RX ADMIN — FENTANYL CITRATE 50 MCG: 50 INJECTION, SOLUTION INTRAMUSCULAR; INTRAVENOUS at 15:36

## 2021-09-02 RX ADMIN — OXYCODONE HYDROCHLORIDE AND ACETAMINOPHEN 1 TABLET: 5; 325 TABLET ORAL at 20:52

## 2021-09-02 RX ADMIN — QUETIAPINE FUMARATE 200 MG: 100 TABLET ORAL at 20:52

## 2021-09-02 RX ADMIN — ROCURONIUM BROMIDE 20 MG: 10 SOLUTION INTRAVENOUS at 15:36

## 2021-09-02 RX ADMIN — DEXMEDETOMIDINE HYDROCHLORIDE 10 MCG: 100 INJECTION, SOLUTION, CONCENTRATE INTRAVENOUS at 15:32

## 2021-09-02 RX ADMIN — SODIUM CHLORIDE 50 ML/HR: 900 INJECTION, SOLUTION INTRAVENOUS at 14:39

## 2021-09-02 RX ADMIN — DEXAMETHASONE SODIUM PHOSPHATE 4 MG: 4 INJECTION, SOLUTION INTRAMUSCULAR; INTRAVENOUS at 15:44

## 2021-09-02 RX ADMIN — FENTANYL CITRATE 50 MCG: 50 INJECTION, SOLUTION INTRAMUSCULAR; INTRAVENOUS at 17:22

## 2021-09-02 RX ADMIN — CALCIUM CARBONATE-VITAMIN D TAB 500 MG-200 UNIT 2 TABLET: 500-200 TAB at 20:52

## 2021-09-02 RX ADMIN — FENTANYL CITRATE 25 MCG: 0.05 INJECTION, SOLUTION INTRAMUSCULAR; INTRAVENOUS at 17:50

## 2021-09-02 RX ADMIN — ONDANSETRON HYDROCHLORIDE 4 MG: 2 INJECTION, SOLUTION INTRAMUSCULAR; INTRAVENOUS at 15:44

## 2021-09-02 RX ADMIN — LIDOCAINE HYDROCHLORIDE 80 MG: 20 INJECTION, SOLUTION EPIDURAL; INFILTRATION; INTRACAUDAL; PERINEURAL at 15:36

## 2021-09-02 RX ADMIN — MORPHINE SULFATE 2 MG: 2 INJECTION, SOLUTION INTRAMUSCULAR; INTRAVENOUS at 19:05

## 2021-09-02 RX ADMIN — Medication 10 MG: at 15:52

## 2021-09-02 RX ADMIN — CEFAZOLIN 2 G: 330 INJECTION, POWDER, FOR SOLUTION INTRAMUSCULAR; INTRAVENOUS at 15:53

## 2021-09-02 RX ADMIN — Medication 10 ML: at 20:53

## 2021-09-02 RX ADMIN — Medication 10 MG: at 16:48

## 2021-09-02 RX ADMIN — MIDAZOLAM 2 MG: 1 INJECTION INTRAMUSCULAR; INTRAVENOUS at 15:28

## 2021-09-02 RX ADMIN — HYDROMORPHONE HYDROCHLORIDE 1 MG: 2 INJECTION, SOLUTION INTRAMUSCULAR; INTRAVENOUS; SUBCUTANEOUS at 15:49

## 2021-09-02 RX ADMIN — ROCURONIUM BROMIDE 20 MG: 10 SOLUTION INTRAVENOUS at 16:19

## 2021-09-02 RX ADMIN — ONDANSETRON 4 MG: 2 INJECTION INTRAMUSCULAR; INTRAVENOUS at 19:05

## 2021-09-02 RX ADMIN — MORPHINE SULFATE 2 MG: 2 INJECTION, SOLUTION INTRAMUSCULAR; INTRAVENOUS at 18:05

## 2021-09-02 RX ADMIN — MEPERIDINE HYDROCHLORIDE 12.5 MG: 25 INJECTION INTRAMUSCULAR; INTRAVENOUS; SUBCUTANEOUS at 18:14

## 2021-09-02 RX ADMIN — PROPOFOL 200 MG: 10 INJECTION, EMULSION INTRAVENOUS at 15:36

## 2021-09-02 RX ADMIN — FENTANYL CITRATE 25 MCG: 0.05 INJECTION, SOLUTION INTRAMUSCULAR; INTRAVENOUS at 17:45

## 2021-09-02 RX ADMIN — PROPOFOL 100 MG: 10 INJECTION, EMULSION INTRAVENOUS at 16:19

## 2021-09-02 NOTE — PERIOP NOTES
TRANSFER - OUT REPORT:    Verbal report given to JAZ Nunez(name) on Anne Pérez  being transferred to room 506(unit) for routine post - op       Report consisted of patients Situation, Background, Assessment and   Recommendations(SBAR). Time Pre op antibiotic given:1553  Anesthesia Stop time: 6434  Lindsay Present on Transfer to floor:n/a  Order for Lindsay on Chart:n/a  Discharge Prescriptions with Chart:n/a    Information from the following report(s) SBAR, Kardex, OR Summary, Procedure Summary, Intake/Output, MAR, Recent Results and Cardiac Rhythm sr was reviewed with the receiving nurse. Opportunity for questions and clarification was provided. Is the patient on 02? YES       L/Min 2  Is the patient on a monitor? NO    Is the nurse transporting with the patient? NO    Surgical Waiting Area notified of patient's transfer from PACU? YES      The following personal items collected during your admission accompanied patient upon transfer:   Dental Appliance: Dental Appliances: Other (comment) (BAG OF CLOTHES RETURNED TO PT IN PACU)  Vision: Visual Aid: None  Hearing Aid:    Jewelry: Jewelry: Earrings, Other (comment) (Nose ring and ear piercing to right ear)  Clothing: Clothing: Pants, Shirt, Undergarments, Footwear (Sent to PACU)  Other Valuables:  Other Valuables: Cell Phone, Money (comment), Wallet (Patient sent valuables with security -- 50 dollar bill)  Valuables sent to safe: Personal Items Sent to Safe: (S) Reference ticket [id number]

## 2021-09-02 NOTE — ANESTHESIA POSTPROCEDURE EVALUATION
Procedure(s):  PARATHYROIDECTOMY.     general    Anesthesia Post Evaluation      Multimodal analgesia: multimodal analgesia used between 6 hours prior to anesthesia start to PACU discharge  Patient location during evaluation: PACU  Patient participation: complete - patient participated  Level of consciousness: awake  Pain management: adequate  Airway patency: patent  Anesthetic complications: no  Cardiovascular status: acceptable  Respiratory status: acceptable  Hydration status: acceptable  Post anesthesia nausea and vomiting:  none  Final Post Anesthesia Temperature Assessment:  Normothermia (36.0-37.5 degrees C)      INITIAL Post-op Vital signs:   Vitals Value Taken Time   /68 09/02/21 1815   Temp 36.7 °C (98.1 °F) 09/02/21 1805   Pulse 66 09/02/21 1825   Resp 17 09/02/21 1825   SpO2 99 % 09/02/21 1825

## 2021-09-02 NOTE — H&P
History and Physical    Subjective:     Fior Lemus is a 38 y.o.  female who presents with hypercalcemia related to primary hyperparathyroidism, imaging suggesting left-sided parathyroid mass. Past Medical History:   Diagnosis Date    Anemia NEC     Anxiety 8/29/13    HAS HAD PANIC ATTACKS, NUMEROUS IN PAST; NONE SINCE 2012    Anxiety     Asthma     Calculus of kidney     GERD (gastroesophageal reflux disease)     Hx: UTI (urinary tract infection)     patient states caused by kidney stones and being treated by PCP    Morbid obesity ([facility] [address]       Past Surgical History:   Procedure Laterality Date    HX BREAST REDUCTION  2006    HX DILATION AND CURETTAGE      HX GASTRECTOMY  9/12/13    lap sleeve gastrectomy by Dr Hnog Frank HX LAP CHOLECYSTECTOMY      HX ORTHOPAEDIC      R knee surgery 2005 ARTHROSCOPIC MENISCUS REPAIR    HX REFRACTIVE SURGERY       Family History   Problem Relation Age of Onset    Obesity Mother     Hypertension Father     No Known Problems Brother     Anesth Problems Neg Hx       Social History     Tobacco Use    Smoking status: Never Smoker    Smokeless tobacco: Never Used   Substance Use Topics    Alcohol use: Yes     Alcohol/week: 0.0 standard drinks     Comment: occasionally; 3x year       Prior to Admission medications    Medication Sig Start Date End Date Taking? Authorizing Provider   FLUoxetine (PROzac) 20 mg tablet Take 20 mg by mouth daily. Yes Provider, Historical   acetaminophen (TYLENOL) 325 mg tablet Take 650 mg by mouth every four (4) hours as needed for Pain. Yes Provider, Historical   cyanocobalamin (VITAMIN B-12) 1,000 mcg/mL injection 1 mL by IntraMUSCular route every twenty-eight (28) days. Indications: inadequate vitamin B12  Patient taking differently: 1,000 mcg by IntraMUSCular route every twenty-eight (28) days.  PATIENT CANT REMEMBER LAST TIME  Indications: inadequate vitamin B12 10/17/19  Yes Elton Cabral NP multivitamin,tx-iron-minerals Tab Take  by mouth. Yes Provider, Historical   QUEtiapine (SEROquel) 100 mg tablet Take 200 mg by mouth At bedtime. 12/14/20   Provider, Historical   hydrOXYzine HCL (ATARAX) 25 mg tablet Take 1-2 tabs PO PRN anxiety/panic 3/3/21   Provider, Historical   Syringe with Needle, Disp, (BD LUER-FRANCIS SYRINGE) 3 mL 23 x 1\" syrg USE EVERY MONTH intramuscularly as directed by prescriber 10/17/19   Holli Curiel NP     Allergies   Allergen Reactions    Clindamycin Other (comments)     Per patient clindamycin gives yeast infections    Other Medication Other (comments)     HAS HAD YEAST INFECTIONS AFTER CLINDAMYCIN        Review of Systems:  A comprehensive review of systems was negative except for that written in the History of Present Illness. Objective: Intake and Output:    No intake/output data recorded. No intake/output data recorded. Physical Exam:   Visit Vitals  BP (!) 118/55 (BP 1 Location: Left upper arm, BP Patient Position: At rest;Lying)   Pulse 67   Temp 98.5 °F (36.9 °C)   Resp 17   Ht 5' 3\" (1.6 m)   Wt 110 kg (242 lb 8.1 oz)   LMP 08/22/2021   SpO2 100%   BMI 42.96 kg/m²     General:  Alert, cooperative, no distress, appears stated age. Head:  Normocephalic, without obvious abnormality, atraumatic. Eyes:  Conjunctivae/corneas clear. PERRL, EOMs intact. Fundi benign. Ears:  Normal TMs and external ear canals both ears. Nose: Nares normal. Septum midline. Mucosa normal. No drainage or sinus tenderness. Throat: Lips, mucosa, and tongue normal. Teeth and gums normal.   Neck: Supple, symmetrical, trachea midline, no adenopathy, thyroid: no enlargement/tenderness/nodules, no carotid bruit and no JVD. Back:   Symmetric, no curvature. ROM normal. No CVA tenderness. Lungs:   Clear to auscultation bilaterally. Chest wall:  No tenderness or deformity. Heart:  Regular rate and rhythm, S1, S2 normal, no murmur, click, rub or gallop. Extremities: Extremities normal, atraumatic, no cyanosis or edema. Pulses: 2+ and symmetric all extremities. Skin: Skin color, texture, turgor normal. No rashes or lesions. Lymph nodes: Cervical, supraclavicular, and axillary nodes normal.   Neurologic: CNII-XII intact. Normal strength, sensation and reflexes throughout.          Data Review:   Recent Results (from the past 24 hour(s))   HCG URINE, QL. - POC    Collection Time: 09/02/21  2:40 PM   Result Value Ref Range    Pregnancy test,urine (POC) Negative NEG           Assessment:     Primary hyperparathyroidism    Plan:     Parathyroid exploration    Signed By: Evelina Rai MD     September 2, 2021

## 2021-09-02 NOTE — ANESTHESIA PREPROCEDURE EVALUATION
Relevant Problems   No relevant active problems       Anesthetic History   No history of anesthetic complications            Review of Systems / Medical History  Patient summary reviewed, nursing notes reviewed and pertinent labs reviewed    Pulmonary    COPD        Asthma        Neuro/Psych         Psychiatric history     Cardiovascular                  Exercise tolerance: >4 METS     GI/Hepatic/Renal     GERD    Renal disease: stones      Comments: lap sleeve gastrectomy by Dr Jim Caldwell Endo/Other        Morbid obesity    Comments: hypercalcemia  Other Findings   Comments: Denies pregnancy           Physical Exam    Airway  Mallampati: II  TM Distance: 4 - 6 cm  Neck ROM: normal range of motion, short neck   Mouth opening: Normal     Cardiovascular  Regular rate and rhythm,  S1 and S2 normal,  no murmur, click, rub, or gallop  Rhythm: regular  Rate: normal         Dental  No notable dental hx       Pulmonary  Breath sounds clear to auscultation               Abdominal  GI exam deferred       Other Findings            Anesthetic Plan    ASA: 2  Anesthesia type: general          Induction: Intravenous  Anesthetic plan and risks discussed with: Patient

## 2021-09-03 VITALS
BODY MASS INDEX: 42.97 KG/M2 | WEIGHT: 242.51 LBS | TEMPERATURE: 98 F | SYSTOLIC BLOOD PRESSURE: 98 MMHG | HEIGHT: 63 IN | OXYGEN SATURATION: 95 % | RESPIRATION RATE: 16 BRPM | DIASTOLIC BLOOD PRESSURE: 63 MMHG | HEART RATE: 80 BPM

## 2021-09-03 LAB
CALCIUM SERPL-MCNC: 8.7 MG/DL (ref 8.5–10.1)
CALCIUM SERPL-MCNC: 8.7 MG/DL (ref 8.5–10.1)
PTH-INTACT SERPL-MCNC: 6.9 PG/ML (ref 18.4–88)

## 2021-09-03 PROCEDURE — 99218 HC RM OBSERVATION: CPT

## 2021-09-03 PROCEDURE — 36415 COLL VENOUS BLD VENIPUNCTURE: CPT

## 2021-09-03 PROCEDURE — 77010033678 HC OXYGEN DAILY

## 2021-09-03 PROCEDURE — 83970 ASSAY OF PARATHORMONE: CPT

## 2021-09-03 PROCEDURE — 82310 ASSAY OF CALCIUM: CPT

## 2021-09-03 PROCEDURE — 74011250636 HC RX REV CODE- 250/636: Performed by: OTOLARYNGOLOGY

## 2021-09-03 PROCEDURE — 74011250637 HC RX REV CODE- 250/637: Performed by: OTOLARYNGOLOGY

## 2021-09-03 RX ORDER — FERROUS SULFATE, DRIED 160(50) MG
2 TABLET, EXTENDED RELEASE ORAL
Qty: 65 TABLET | Refills: 1 | Status: SHIPPED | OUTPATIENT
Start: 2021-09-03 | End: 2021-09-24

## 2021-09-03 RX ORDER — OMEPRAZOLE 40 MG/1
40 CAPSULE, DELAYED RELEASE ORAL DAILY
Qty: 30 CAPSULE | Refills: 1 | Status: SHIPPED | OUTPATIENT
Start: 2021-09-03

## 2021-09-03 RX ORDER — HYDROCODONE BITARTRATE AND ACETAMINOPHEN 5; 325 MG/1; MG/1
2 TABLET ORAL
Qty: 30 TABLET | Refills: 0 | Status: SHIPPED | OUTPATIENT
Start: 2021-09-03 | End: 2021-09-10

## 2021-09-03 RX ADMIN — SODIUM CHLORIDE, POTASSIUM CHLORIDE, SODIUM LACTATE AND CALCIUM CHLORIDE 75 ML/HR: 600; 310; 30; 20 INJECTION, SOLUTION INTRAVENOUS at 00:18

## 2021-09-03 RX ADMIN — OXYCODONE HYDROCHLORIDE AND ACETAMINOPHEN 1 TABLET: 5; 325 TABLET ORAL at 21:26

## 2021-09-03 RX ADMIN — ONDANSETRON 4 MG: 2 INJECTION INTRAMUSCULAR; INTRAVENOUS at 00:25

## 2021-09-03 RX ADMIN — FLUOXETINE 20 MG: 20 CAPSULE ORAL at 09:47

## 2021-09-03 RX ADMIN — OXYCODONE HYDROCHLORIDE AND ACETAMINOPHEN 1 TABLET: 5; 325 TABLET ORAL at 03:19

## 2021-09-03 RX ADMIN — MORPHINE SULFATE 2 MG: 2 INJECTION, SOLUTION INTRAMUSCULAR; INTRAVENOUS at 00:25

## 2021-09-03 RX ADMIN — QUETIAPINE FUMARATE 200 MG: 100 TABLET ORAL at 21:26

## 2021-09-03 RX ADMIN — OXYCODONE HYDROCHLORIDE AND ACETAMINOPHEN 1 TABLET: 5; 325 TABLET ORAL at 07:12

## 2021-09-03 RX ADMIN — MORPHINE SULFATE 2 MG: 2 INJECTION, SOLUTION INTRAMUSCULAR; INTRAVENOUS at 13:49

## 2021-09-03 RX ADMIN — CALCIUM CARBONATE-VITAMIN D TAB 500 MG-200 UNIT 2 TABLET: 500-200 TAB at 07:12

## 2021-09-03 RX ADMIN — CALCIUM CARBONATE-VITAMIN D TAB 500 MG-200 UNIT 2 TABLET: 500-200 TAB at 12:56

## 2021-09-03 NOTE — DISCHARGE INSTRUCTIONS
Patient Education        Parathyroidectomy: What to Expect at Home  Your Recovery     Parathyroidectomy is the removal of one or more of the four parathyroid glands in the neck. These small glands, located on the thyroid gland, help control the amount of calcium in the body. When they are too active, these glands cause high levels of calcium. This is called hyperparathyroidism (say \"hy-per-pair-ju-CWB-xyoq-iz-um\"). You may leave the hospital with stitches in the cut the doctor made (incision). Your doctor will tell you if you need to come back to have these removed. You may still have a tube called a drain in your neck. Your doctor will take this out a few days after your surgery. You may have some trouble chewing and swallowing after you go home. Your voice probably will be hoarse, and you may have trouble talking. For most people, these problems get better within a few weeks, but it can take longer. In some cases, this surgery causes permanent problems with chewing, speaking, or swallowing. Note discussion about possible low calcium levels after this surgery and how it can manifest with numbness, tingling and cramping, and hence taking calcium supplements for the next 3 weeks at least.     This care sheet gives you a general idea about how long it will take for you to recover. But each person recovers at a different pace. Follow the steps below to get better as quickly as possible. How can you care for yourself at home? Activity    · Rest when you feel tired. Getting enough sleep will help you recover. When you lie down, put two or three pillows under your head to keep it raised.     · Try to walk each day. Start by walking a little more than you did the day before. Bit by bit, increase the amount you walk. Walking boosts blood flow and helps prevent pneumonia and constipation.     · Avoid strenuous physical activity and lifting heavy objects for 3 weeks after surgery or until your doctor says it is okay.   · Do not over-extend your neck backwards for 2 weeks after surgery.     · Ask your doctor when you can drive again.     · You may take a shower, unless you still have a drain. Pat the incision dry. If you have a drain coming out of your incision, follow your doctor's instructions to care for it. Diet    · If swallowing is painful, start out with cold drinks, flavored ice pops, and ice cream. Next, try soft foods like pudding, yogurt, canned or cooked fruit, scrambled eggs, and mashed potatoes. Avoid eating hard or scratchy foods like chips or raw vegetables. Avoid orange or tomato juice and other acidic foods that can sting the throat.     · If you cough right after drinking, try drinking thicker liquids, such as a smoothie.     · You may notice that your bowel movements are not regular right after your surgery. This is common. Try to avoid constipation and straining with bowel movements. You may want to take a fiber supplement every day. If you have not had a bowel movement after a couple of days, ask your doctor about taking a mild laxative. Medicines    · Your doctor will tell you if and when you can restart your medicines. He or she will also give you instructions about taking any new medicines.     · If you take aspirin or some other blood thinner, ask your doctor if and when to start taking it again. Make sure that you understand exactly what your doctor wants you to do.     · Be safe with medicines. Take pain medicines exactly as directed. ? If the doctor gave you a prescription medicine for pain, take it as prescribed. ? If you are not taking a prescription pain medicine, ask your doctor if you can take an over-the-counter medicine.     · If you think your pain medicine is making you sick to your stomach:  ? Take your medicine after meals (unless your doctor has told you not to). ?  Ask your doctor for a different pain medicine.     · Your doctor may prescribe calcium to prevent problems after surgery from low calcium. Not having enough calcium can cause symptoms such as tingling around your mouth or in your hands and feet.     · Your doctor may have prescribed antibiotics. Take them as directed. Do not stop taking them just because you feel better. You need to take the full course of antibiotics. Incision care    · If your doctor told you how to care for your incision, follow your doctor's instructions. If you did not get instructions, follow this general advice:  ? After the first 24 to 48 hours, wash around the incision with clean water 2 times a day. Don't use hydrogen peroxide or alcohol, which can slow healing.     · You may have a drain near your incision. Your doctor will tell you how to take care of it. Follow-up care is a key part of your treatment and safety. Be sure to make and go to all appointments, and call your doctor if you are having problems. It's also a good idea to know your test results and keep a list of the medicines you take. When should you call for help? Call 911 anytime you think you may need emergency care. For example, call if:    · You passed out (lost consciousness).     · You have sudden chest pain and shortness of breath, or you cough up blood.     · You have severe trouble breathing. Call your doctor now or seek immediate medical care if:    · You have loose stitches, or your incision comes open.     · You have blood leaking from your incision.     · You have signs of infection, such as:  ? Increased pain, swelling, warmth, or redness. ? Red streaks leading from the incision. ? Pus draining from the incision. ? A fever.     · You have a tingling feeling around your mouth.     · You have cramping or tingling in your hands and feet.    Watch closely for changes in your health, and be sure to contact your doctor if:    · You have trouble talking.     · You are sick to your stomach or cannot keep fluids down.     · You do not have a bowel movement after taking a laxative. Where can you learn more? Go to http://www.gray.com/  Enter M010 in the search box to learn more about \"Parathyroidectomy: What to Expect at Home. \"  Current as of: March 31, 2020               Content Version: 12.8  © 3320-6903 Healthwise, Incorporated. Care instructions adapted under license by [employer] (which disclaims liability or warranty for this information). If you have questions about a medical condition or this instruction, always ask your healthcare professional. [address] any warranty or liability for your use of this information.

## 2021-09-03 NOTE — PROGRESS NOTES
Bedside shift change report given to Juliane Curtis RN (oncoming nurse) by Sol Boss RN (offgoing nurse). Report included the following information SBAR, Kardex, Procedure Summary, Intake/Output, MAR and Recent Results.

## 2021-09-03 NOTE — PROGRESS NOTES
Otolaryngology, Head and Neck Surgery        The patient is post operative day 1 from parathyroid exploration. she is doing well and is tolerating a diet and the pain is under control. The patient denies any chest pain or shortness of breath.       Hospital Problems  Date Reviewed: 4/14/2021        Codes Class Noted POA    Status post surgery ICD-10-CM: Z98.890  ICD-9-CM: V45.89  9/2/2021 Unknown              Current Facility-Administered Medications   Medication Dose Route Frequency    sodium chloride (NS) flush 5-40 mL  5-40 mL IntraVENous Q8H    QUEtiapine (SEROquel) tablet 200 mg  200 mg Oral QHS    hydrOXYzine HCL (ATARAX) tablet 10 mg  10 mg Oral Q6H PRN    FLUoxetine (PROzac) capsule 20 mg  20 mg Oral DAILY    sodium chloride (NS) flush 5-40 mL  5-40 mL IntraVENous Q8H    sodium chloride (NS) flush 5-40 mL  5-40 mL IntraVENous PRN    oxyCODONE-acetaminophen (PERCOCET) 5-325 mg per tablet 1 Tablet  1 Tablet Oral Q4H PRN    morphine injection 2 mg  2 mg IntraVENous Q4H PRN    ondansetron (ZOFRAN) injection 4 mg  4 mg IntraVENous Q4H PRN    calcium-vitamin D (OS-ALEIDA +D3) 500 mg-200 unit per tablet 2 Tablet  2 Tablet Oral TID WITH MEALS       Recent Results (from the past 24 hour(s))   CALCIUM    Collection Time: 09/03/21  4:45 AM   Result Value Ref Range    Calcium 8.7 8.5 - 10.1 MG/DL   PTH INTACT    Collection Time: 09/03/21  4:47 AM   Result Value Ref Range    Calcium 8.7 8.5 - 10.1 MG/DL    PTH, Intact 6.9 (L) 18.4 - 88.0 pg/mL           Visit Vitals  BP 98/63   Pulse 80   Temp 98 °F (36.7 °C)   Resp 16   Ht 5' 3\" (1.6 m)   Wt 110 kg (242 lb 8.1 oz)   LMP 08/22/2021   SpO2 95%   BMI 42.96 kg/m²       Intake/Output Summary (Last 24 hours) at 9/3/2021 1842  Last data filed at 9/3/2021 1025  Gross per 24 hour   Intake    Output 677 ml   Net -677 ml       The patient is a well developed, well nourished adult in no distress    Neck: incision intact, no swelling or increased erythema or induration  Gravelly voice, but musical      Chest: Clear to auscultation bilaterally. No wheezes or crackles  Cardiovascular: Regular rate and rhythm    Lower extremities: no calf tenderness    A:   Hospital Problems  Date Reviewed: 4/14/2021        Codes Class Noted POA    Status post surgery ICD-10-CM: Z98.890  ICD-9-CM: V45.89  9/2/2021 Unknown                   Plan:  D/c home  Discussed hypocalcemia, presentation and management  F/u 10 days.    Plan Rx with oscal D because of high risk of bone hunger/hypocalcemia while other glands awake  Rx reflux medicine because of gravelly voice, likely post intubation/reflux

## 2021-09-04 NOTE — PROGRESS NOTES
Bedside shift change report given to gladys mcnamara (oncoming nurse) by Dallas Gallegos (offgoing nurse). Report included the following information SBAR and Recent Results.

## 2021-09-20 NOTE — OP NOTES
[address]  OPERATIVE REPORT    Name:  Jazlyn Sams  MR#:  [id number]  :  1980  ACCOUNT #:  [de-identified]  DATE OF SERVICE:  2021      PREOPERATIVE DIAGNOSES:  1. Primary hyperparathyroidism. 2.  Hypercalcemia related to primary hyperparathyroidism. POSTOPERATIVE DIAGNOSES:  1. Primary hyperparathyroidism. 2.  Hypercalcemia related to primary hyperparathyroidism. PROCEDURES PERFORMED:  1. Parathyroid exploration, bilateral.  2.  Partial excision of thyroid lobe, left. SURGEON:  Roz Butler MD    ASSISTANT:  Surgical assistant. ANESTHESIA:  General endotracheal tube anesthesia. COMPLICATIONS:  No complication. SPECIMENS REMOVED:  Left superior parathyroid gland and partial left thyroid lobe, superior. IMPLANTS:  No implant. ESTIMATED BLOOD LOSS:  15 mL    INDICATIONS AND FINDINGS:  The patient had primary hyperparathyroidism, imaging suggesting left-sided parathyroid mass, and hence, indications. Intraoperatively, normal left inferior, right superior, and right inferior glands were identified but not the left superior gland. This being the proximal location of the preoperative imaging, there was a possibility of an intrathyroidal parathyroid gland. A partial thyroid lobectomy was therefore performed, identifying the enlarged parathyroid gland within this and hence the appropriate surgery for both procedures. Parathyroid hormone levels dropped from a preoperative 380 to 37 units, concerning for the parathyroid glands being physiologically inactive. PROCEDURE IN DETAIL:  In the operating room, the patient was induced under general endotracheal tube anesthesia following which she was prepped and draped in sterile fashion. 1% lidocaine with 1:100,000 part epinephrine was used to inject the lower cervical rhytid. An incision was made, 4 cm, elevating superior and inferior subplatysmal skin flaps retracted with silk sutures.   The strap muscles were divided in the midline, identifying the capsule of the thyroid, dissecting this in an extracapsular plane. The left gland was first dissected identifying the normal-appearing left inferior parathyroid but no superior. The right side was then dissected, identifying superior and inferior parathyroid glands that appeared normal.  Attention was turned again to the left superior region and parathyroid gland region, dissecting around to the paratracheal and paraesophageal anatomy without identification of abnormal gland, imaging suggesting this location. Decision was made to perform a partial thyroid lobectomy in case the parathyroid gland was intrathyroidal, using bipolar cautery to dissect within the left lateral lobe, having identified the recurrent laryngeal nerve, protecting its course into the cricothyroid membrane. During this dissection, a large parathyroid gland was identified, dissecting this out in an extracapsular fashion, first biopsying it, returning as hypercellular parathyroid and so hence, completion of partial thyroid lobectomy and parathyroid dissection. Bipolar cautery was used for point hemostasis. Parathyroid hormone levels were drawn at appropriate time frames throughout the surgery with results returning as above, indicating completed task. Wound was irrigated with saline and a Valsalva maneuver used to check hemostasis. #7 DIAMOND drain was placed on the left secured with a nylon suture. 3-0 Vicryl was used to close the strap muscles in the midline, using the same to close the platysma flaps. A 5-0 Monocryl was then used in an intracuticular fashion to close the skin, reinforced by Mastisol and Steri-Strips. The patient was then handed over to Anesthesia for awakening and transferred to the recovery room.         MD DIVYA [email or web address]  D:  09/19/2021 19:29  T:  09/20/2021 0:48  JOB #:  [id number]  CC:  [facility]

## 2021-09-30 ENCOUNTER — OFFICE VISIT (OUTPATIENT)
Dept: SURGERY | Age: 41
End: 2021-09-30
Payer: COMMERCIAL

## 2021-09-30 VITALS
BODY MASS INDEX: 43.16 KG/M2 | TEMPERATURE: 98.9 F | HEIGHT: 63 IN | RESPIRATION RATE: 18 BRPM | HEART RATE: 68 BPM | SYSTOLIC BLOOD PRESSURE: 108 MMHG | OXYGEN SATURATION: 98 % | DIASTOLIC BLOOD PRESSURE: 64 MMHG | WEIGHT: 243.6 LBS

## 2021-09-30 DIAGNOSIS — K21.9 GASTROESOPHAGEAL REFLUX DISEASE WITHOUT ESOPHAGITIS: ICD-10-CM

## 2021-09-30 DIAGNOSIS — R63.5 WEIGHT GAIN, ABNORMAL: Primary | ICD-10-CM

## 2021-09-30 PROCEDURE — 99213 OFFICE O/P EST LOW 20 MIN: CPT | Performed by: SURGERY

## 2021-10-04 NOTE — PATIENT INSTRUCTIONS
Abnormal Weight Gain: Care Instructions  Your Care Instructions     There are two types of weight gainnormal and abnormal. Normal weight gain is usually caused by eating too much or exercising too little. It can also happen as you get older. But abnormal weight gain has other causes. It can be caused by a problem with your thyroid gland, called hypothyroidism. Or it can be caused by a problem with your adrenal glands, called Cushing's syndrome. Or your body could be holding too much fluid because of kidney, liver, or heart problems. In some cases, a medicine you take can cause you to gain weight. You can work with your doctor to find out the cause of your weight gain. You will probably need tests to do this. Follow-up care is a key part of your treatment and safety. Be sure to make and go to all appointments, and call your doctor if you are having problems. It's also a good idea to know your test results and keep a list of the medicines you take. How can you care for yourself at home? · Weigh yourself at the same time every day. It's best to do it first thing in the morning after you empty your bladder. Be sure to always wear the same amount of clothing. · Write down any changes in your weight and the possible causes. Discuss these with your doctor. · Your doctor may want you to change your diet and exercise habits. A good way to lose weight is to reduce calories and increase exercise. · Walking is an easy way to get exercise. Try to walk a little longer every day. You also may want to swim, bike, or do other activities. · Ask your doctor if you should see a dietitian. This is a person who can help you plan meals that work best for your lifestyle. · If your doctor prescribed medicines, take them exactly as prescribed. Call your doctor if you think you are having a problem with your medicine. You will get more details on the specific medicines your doctor prescribes.   When should you call for help?  Watch closely for changes in your health, and be sure to contact your doctor if:    · You do not get better as expected.     · You continue to gain weight. Where can you learn more? Go to http://www.love.com/  Enter A175 in the search box to learn more about \"Abnormal Weight Gain: Care Instructions. \"  Current as of: March 17, 2021               Content Version: 13.0  © 7522-4449 Healthwise, .com. Care instructions adapted under license by [employer] (which disclaims liability or warranty for this information). If you have questions about a medical condition or this instruction, always ask your healthcare professional. [address] any warranty or liability for your use of this information.

## 2021-10-04 NOTE — PROGRESS NOTES
Subjective:      Sol Chahal is a 38 y.o. female presents for weight gain following sleeve gastrectomy. She has history of laparoscopic sleeve gastrectomy performed in 2013, with excellent weight loss results (sheridan 160 pounds). She notes a 50 to 60 pound weight gain over the last year secondary to multiple health and social factors. She is taking in liquid calories, and rarely able to exercise. She also notes reflux symptoms. Objective:     Visit Vitals  /64 (BP 1 Location: Right arm, BP Patient Position: Sitting, BP Cuff Size: Adult)   Pulse 68   Temp 98.9 °F (37.2 °C) (Oral)   Resp 18   Ht 5' 3\" (1.6 m)   Wt 243 lb 9.6 oz (110.5 kg)   SpO2 98%   BMI 43.15 kg/m²       General:  alert, cooperative, no distress, appears stated age, morbidly obese   Abdomen:  Deferred   Incision:   Deferred     Assessment:     Weight gain following sleeve gastrectomy-appears to be behavioral in nature. Gastroesophageal reflux disease-May be related to hiatal hernia. Plan:     1. Continue current medications. 2.  Reviewed appropriate bariatric diet, patient acknowledges understanding. We will also refer to dietitian to assist patient to get back on track. 3.  We will order upper gastrointestinal series to assess for hiatal hernia. 4.  Follow-up in 4 weeks.

## 2021-10-11 ENCOUNTER — HOSPITAL ENCOUNTER (OUTPATIENT)
Dept: GENERAL RADIOLOGY | Age: 41
Discharge: HOME | End: 2021-10-11
Attending: SURGERY
Payer: COMMERCIAL

## 2021-10-11 ENCOUNTER — APPOINTMENT (OUTPATIENT)
Dept: GENERAL RADIOLOGY | Age: 41
End: 2021-10-11
Attending: SURGERY
Payer: COMMERCIAL

## 2021-10-11 DIAGNOSIS — K21.9 GASTROESOPHAGEAL REFLUX DISEASE WITHOUT ESOPHAGITIS: ICD-10-CM

## 2021-10-11 DIAGNOSIS — R63.5 WEIGHT GAIN, ABNORMAL: ICD-10-CM

## 2021-10-11 PROCEDURE — 74240 X-RAY XM UPR GI TRC 1CNTRST: CPT

## 2021-12-22 LAB — CREATININE, EXTERNAL: 0.89

## 2022-02-21 ENCOUNTER — TELEPHONE (OUTPATIENT)
Dept: SURGERY | Age: 42
End: 2022-02-21

## 2022-02-21 NOTE — TELEPHONE ENCOUNTER
Called patient whom stated she is no longer interested in getting a revision, therefore the visit will be a billed dietician visit. Patients schedule does not coordinate with ours, so she stated she would hold off on the referral for now. Referal will be closed until further notice.

## 2022-03-18 PROBLEM — K21.9 GERD (GASTROESOPHAGEAL REFLUX DISEASE): Status: ACTIVE | Noted: 2020-11-10

## 2022-03-18 PROBLEM — Z98.84 HISTORY OF GASTRIC BYPASS: Status: ACTIVE | Noted: 2020-11-13

## 2022-03-18 PROBLEM — Z98.890 STATUS POST SURGERY: Status: ACTIVE | Noted: 2021-09-02

## 2022-03-18 PROBLEM — K85.90 PANCREATITIS: Status: ACTIVE | Noted: 2019-01-19

## 2022-03-18 PROBLEM — Z00.00 ENCOUNTER FOR MEDICAL EXAMINATION TO ESTABLISH CARE: Status: ACTIVE | Noted: 2020-11-13

## 2022-03-19 PROBLEM — G47.09 OTHER INSOMNIA: Status: ACTIVE | Noted: 2020-11-13

## 2022-03-19 PROBLEM — E83.52 HYPERCALCEMIA: Status: ACTIVE | Noted: 2020-12-14

## 2022-03-20 PROBLEM — Z90.49 S/P LAPAROSCOPIC CHOLECYSTECTOMY: Status: ACTIVE | Noted: 2019-03-11

## 2022-08-01 ENCOUNTER — TELEPHONE (OUTPATIENT)
Dept: SURGERY | Age: 42
End: 2022-08-01

## 2022-12-27 ENCOUNTER — OFFICE VISIT (OUTPATIENT)
Dept: OBGYN CLINIC | Age: 42
End: 2022-12-27
Payer: COMMERCIAL

## 2022-12-27 VITALS — WEIGHT: 220 LBS | BODY MASS INDEX: 38.97 KG/M2 | SYSTOLIC BLOOD PRESSURE: 112 MMHG | DIASTOLIC BLOOD PRESSURE: 70 MMHG

## 2022-12-27 DIAGNOSIS — Z31.89 ENCOUNTER FOR FERTILITY PLANNING: Primary | ICD-10-CM

## 2022-12-27 LAB
ALBUMIN SERPL-MCNC: 3.5 G/DL (ref 3.5–5)
ALBUMIN/GLOB SERPL: 1.2 {RATIO} (ref 1.1–2.2)
ALP SERPL-CCNC: 102 U/L (ref 45–117)
ALT SERPL-CCNC: 14 U/L (ref 12–78)
ANION GAP SERPL CALC-SCNC: 2 MMOL/L (ref 5–15)
AST SERPL-CCNC: 13 U/L (ref 15–37)
BILIRUB SERPL-MCNC: 0.3 MG/DL (ref 0.2–1)
BUN SERPL-MCNC: 12 MG/DL (ref 6–20)
BUN/CREAT SERPL: 14 (ref 12–20)
CALCIUM SERPL-MCNC: 8.5 MG/DL (ref 8.5–10.1)
CHLORIDE SERPL-SCNC: 110 MMOL/L (ref 97–108)
CO2 SERPL-SCNC: 30 MMOL/L (ref 21–32)
CREAT SERPL-MCNC: 0.83 MG/DL (ref 0.55–1.02)
ERYTHROCYTE [DISTWIDTH] IN BLOOD BY AUTOMATED COUNT: 13.6 % (ref 11.5–14.5)
EST. AVERAGE GLUCOSE BLD GHB EST-MCNC: 97 MG/DL
GLOBULIN SER CALC-MCNC: 3 G/DL (ref 2–4)
GLUCOSE SERPL-MCNC: 93 MG/DL (ref 65–100)
HBA1C MFR BLD: 5 % (ref 4–5.6)
HCT VFR BLD AUTO: 35.9 % (ref 35–47)
HGB BLD-MCNC: 11.4 G/DL (ref 11.5–16)
MCH RBC QN AUTO: 27.8 PG (ref 26–34)
MCHC RBC AUTO-ENTMCNC: 31.8 G/DL (ref 30–36.5)
MCV RBC AUTO: 87.6 FL (ref 80–99)
NRBC # BLD: 0 K/UL (ref 0–0.01)
NRBC BLD-RTO: 0 PER 100 WBC
PLATELET # BLD AUTO: 265 K/UL (ref 150–400)
PMV BLD AUTO: 10.7 FL (ref 8.9–12.9)
POTASSIUM SERPL-SCNC: 4.1 MMOL/L (ref 3.5–5.1)
PROT SERPL-MCNC: 6.5 G/DL (ref 6.4–8.2)
RBC # BLD AUTO: 4.1 M/UL (ref 3.8–5.2)
SODIUM SERPL-SCNC: 142 MMOL/L (ref 136–145)
T4 FREE SERPL-MCNC: 1.1 NG/DL (ref 0.8–1.5)
TSH SERPL DL<=0.05 MIU/L-ACNC: 0.69 UIU/ML (ref 0.36–3.74)
WBC # BLD AUTO: 3.6 K/UL (ref 3.6–11)

## 2022-12-27 PROCEDURE — 99205 OFFICE O/P NEW HI 60 MIN: CPT | Performed by: OBSTETRICS & GYNECOLOGY

## 2022-12-27 NOTE — PROGRESS NOTES
Problem Visit    Carol Ann Luther is a 36 y.o. presenting for problem visit. Her main concern today is fertility discussion. She does not have any children. She was pregnant once and miscarried early in the first trimester. She is having regular cycles. She is not using ovulation predictor kits. Patient states she is honestly never tried to conceive before. She would like to lose 20 pounds before conceiving. She is following with endocrinology, Dr. Nav Wiggins. She is currently taking Monjero for weight loss. Was on depo for years. Recently stopped psych meds. States she was never dx with bipolar. States it was given to her to try and sleep after the passing of her fiance and mother. Her partner is 36years old. He has children and his youngest child is 15years old. Patient states she is obviously not tried timed intercourse yet.       Ob/Gyn Hx:  G 1-   [id number]  Menses- regular  Contraception-none  SA- yes    +    Past Medical History:   Diagnosis Date    Anemia NEC     Anxiety 2013    HAS HAD PANIC ATTACKS, NUMEROUS IN PAST; NONE SINCE     Anxiety     Anxiety     Asthma     Calculus of kidney     GERD (gastroesophageal reflux disease)     Hx: UTI (urinary tract infection)     patient states caused by kidney stones and being treated by PCP    Morbid obesity ([facility] [address]        Past Surgical History:   Procedure Laterality Date    HX BREAST REDUCTION      HX DILATION AND CURETTAGE      HX GASTRECTOMY  13    lap sleeve gastrectomy by Dr Estephania MUÑOZ knee surgery  ARTHROSCOPIC MENISCUS REPAIR    HX REFRACTIVE SURGERY         Family History   Problem Relation Age of Onset    Obesity Mother     Hypertension Father     No Known Problems Brother     Anesth Problems Neg Hx        Social History     Socioeconomic History    Marital status: SINGLE     Spouse name: Not on file    Number of children: 0    Years of education: HS grad Highest education level: Not on file   Occupational History    Occupation: unemployed     Employer: [employer]   Tobacco Use    Smoking status: Never    Smokeless tobacco: Never   Vaping Use    Vaping Use: Never used   Substance and Sexual Activity    Alcohol use: Yes     Alcohol/week: 0.0 standard drinks     Comment: occasionally; 3x year    Drug use: No    Sexual activity: Yes   Other Topics Concern    Not on file   Social History Narrative    Unemployed; wants to work in school system. Lives with parents      Social Determinants of Health     Financial Resource Strain: Not on file   Food Insecurity: Not on file   Transportation Needs: Not on file   Physical Activity: Not on file   Stress: Not on file   Social Connections: Not on file   Intimate Partner Violence: Not on file   Housing Stability: Not on file       Current Outpatient Medications   Medication Sig Dispense Refill    acetaminophen (TYLENOL) 325 mg tablet Take 650 mg by mouth every four (4) hours as needed for Pain. QUEtiapine (SEROquel) 100 mg tablet Take 200 mg by mouth At bedtime. hydrOXYzine HCL (ATARAX) 25 mg tablet Take 1-2 tabs PO PRN anxiety/panic      cyanocobalamin (VITAMIN B-12) 1,000 mcg/mL injection 1 mL by IntraMUSCular route every twenty-eight (28) days. Indications: inadequate vitamin B12 (Patient taking differently: 1,000 mcg by IntraMUSCular route every twenty-eight (28) days. PATIENT CANT REMEMBER LAST TIME  Indications: inadequate vitamin B12) 10 mL 1    Syringe with Needle, Disp, (BD LUER-FRANCIS SYRINGE) 3 mL 23 x 1\" syrg USE EVERY MONTH intramuscularly as directed by prescriber 12 Syringe 1    multivitamin,tx-iron-minerals Tab Take  by mouth. omeprazole (PRILOSEC) 40 mg capsule Take 1 Capsule by mouth daily. (Patient not taking: Reported on 12/27/2022) 30 Capsule 1    FLUoxetine (PROzac) 20 mg tablet Take 20 mg by mouth daily.  (Patient not taking: Reported on 12/27/2022)         Allergies   Allergen Reactions Clindamycin Other (comments)     Per patient clindamycin gives yeast infections    Other Medication Other (comments)     HAS HAD YEAST INFECTIONS AFTER CLINDAMYCIN       Review of Systems - History obtained from the patient, Review of System is negative except as otherwise noted in the HPI      Physical Exam    Visit Vitals  /70   Wt 220 lb (99.8 kg)   LMP 2022 (Exact Date)   BMI 38.97 kg/m²       OBGyn Exam    Constitutional  Appearance: well-nourished, well developed, alert, in no acute distress    HENT  Head and Face: appears normal    Chest  Respiratory Effort: non-labored breathing    Cardiovascular  Heart:  Extremities: no peripheral edema    Skin  General Inspection: no rash, no lesions identified    Neurologic/Psychiatric  Mental Status:  Orientation: grossly oriented to person, place and time  Mood and Affect: mood normal, affect appropriate      Assessment/Plan:  Mike Mabry IS A 36 y.o.   1. Encounter for fertility planning  -Discussed age in detail. She is having regular cycles. She has not using ovulation predictor kits. Discussed tracking her cycles and an andres. Discussed fertility window. Partner is 40 years. He has children and his oldest is 15. Will obtain labs and ultrasound. Discussed referral to fertility specialist[facility]er Se[facility] fertility. Patient declines at this time. Patient will return to office in 2 to 3 weeks. Dee Dee Noriega MD

## 2023-01-10 ENCOUNTER — TELEPHONE (OUTPATIENT)
Dept: OBGYN CLINIC | Age: 43
End: 2023-01-10

## 2023-01-10 NOTE — TELEPHONE ENCOUNTER
I called this patient to remind her of her appointment tomorrow. Pt wants to know what type of ultrasound she will be having tomorrow before her visit?     CB# is [phone]

## 2023-02-22 ENCOUNTER — OFFICE VISIT (OUTPATIENT)
Dept: OBGYN CLINIC | Age: 43
End: 2023-02-22

## 2023-02-22 VITALS — WEIGHT: 205.8 LBS | DIASTOLIC BLOOD PRESSURE: 76 MMHG | SYSTOLIC BLOOD PRESSURE: 100 MMHG | BODY MASS INDEX: 36.46 KG/M2

## 2023-02-22 DIAGNOSIS — Z31.69 INFERTILITY COUNSELING: Primary | ICD-10-CM

## 2023-02-22 DIAGNOSIS — N39.0 RECURRENT UTI: ICD-10-CM

## 2023-02-22 RX ORDER — NITROFURANTOIN 25; 75 MG/1; MG/1
CAPSULE ORAL
Qty: 12 CAPSULE | Refills: 0 | Status: SHIPPED | OUTPATIENT
Start: 2023-02-22

## 2023-02-22 NOTE — PROGRESS NOTES
Problem Visit    Bisi Bright is a 36 y.o. presenting for problem visit. Her main concern today is fertility. She also desires to discuss recurrent UTIs after intercourse. This has been going on for years. Ultrasound today- 2023  TV ULTRASOUND  THE UTERUS IS ANTEVERTED, NORMAL IN SIZE, AND ECHOGENICITY. THE ENDOMETRIUM MEASURES 13.14 MM IN THICKNESS. NO MASSES OR ABNORMALITIES ARE SEEN. RIGHT OVARY APPEARS WNL. LEFT OVARY APPEARS TO HAVE A COMPLEX HETEROGENEOUS MASS MEASURING 31 X 119 X 26 MM. NO FREE FLUID IS SEEN IN THE CDS. Ob/Gyn Hx:  G 1-   LMP-2023  Menses- regular  Contraception-none  SA- yes        Past Medical History:   Diagnosis Date    Anemia NEC     Anxiety 2013    HAS HAD PANIC ATTACKS, NUMEROUS IN PAST; NONE SINCE     Anxiety     Anxiety     Asthma     Calculus of kidney     GERD (gastroesophageal reflux disease)     Hx: UTI (urinary tract infection)     patient states caused by kidney stones and being treated by PCP    Morbid obesity ([address]        Past Surgical History:   Procedure Laterality Date    HX BREAST REDUCTION      HX DILATION AND CURETTAGE      HX GASTRECTOMY  13    lap sleeve gastrectomy by Dr Enzo MUÑOZ knee surgery  ARTHROSCOPIC MENISCUS REPAIR    HX REFRACTIVE SURGERY         Family History   Problem Relation Age of Onset    Ovarian Cancer Mother     Obesity Mother     Hypertension Father     No Known Problems Brother     Anesth Problems Neg Hx        Social History     Socioeconomic History    Marital status: SINGLE     Spouse name: Not on file    Number of children: 0    Years of education: HS grad    Highest education level: Not on file   Occupational History    Occupation: unemployed     Employer: [employer]   Tobacco Use    Smoking status: Never    Smokeless tobacco: Never   Vaping Use    Vaping Use: Never used   Substance and Sexual Activity    Alcohol use:  Yes Alcohol/week: 0.0 standard drinks     Comment: occasionally; 3x year    Drug use: No    Sexual activity: Yes   Other Topics Concern    Not on file   Social History Narrative    Unemployed; wants to work in school system. Lives with parents      Social Determinants of Health     Financial Resource Strain: Not on file   Food Insecurity: Not on file   Transportation Needs: Not on file   Physical Activity: Not on file   Stress: Not on file   Social Connections: Not on file   Intimate Partner Violence: Not on file   Housing Stability: Not on file       Current Outpatient Medications   Medication Sig Dispense Refill    [facility] by SubCUTAneous route. omeprazole (PRILOSEC) 40 mg capsule Take 1 Capsule by mouth daily. (Patient not taking: Reported on 12/27/2022) 30 Capsule 1    FLUoxetine (PROzac) 20 mg tablet Take 20 mg by mouth daily. (Patient not taking: Reported on 12/27/2022)      acetaminophen (TYLENOL) 325 mg tablet Take 650 mg by mouth every four (4) hours as needed for Pain. QUEtiapine (SEROquel) 100 mg tablet Take 200 mg by mouth At bedtime. hydrOXYzine HCL (ATARAX) 25 mg tablet Take 1-2 tabs PO PRN anxiety/panic      cyanocobalamin (VITAMIN B-12) 1,000 mcg/mL injection 1 mL by IntraMUSCular route every twenty-eight (28) days. Indications: inadequate vitamin B12 (Patient taking differently: 1,000 mcg by IntraMUSCular route every twenty-eight (28) days. PATIENT CANT REMEMBER LAST TIME  Indications: inadequate vitamin B12) 10 mL 1    Syringe with Needle, Disp, (BD LUER-FRANCIS SYRINGE) 3 mL 23 x 1\" syrg USE EVERY MONTH intramuscularly as directed by prescriber 12 Syringe 1    multivitamin,tx-iron-minerals Tab Take  by mouth.          Allergies   Allergen Reactions    Clindamycin Other (comments)     Per patient clindamycin gives yeast infections    Other Medication Other (comments)     HAS HAD YEAST INFECTIONS AFTER CLINDAMYCIN       Review of Systems - History obtained from the patient, Review of System is negative except as otherwise noted in the HPI      Physical Exam    Visit Vitals  /76   Wt 205 lb 12.8 oz (93.4 kg)   BMI 36.46 kg/m²       OBGyn Exam    Constitutional  Appearance: well-nourished, well developed, alert, in no acute distress    Neurologic/Psychiatric  Mental Status:  Orientation: grossly oriented to person, place and time  Mood and Affect: mood normal, affect appropriate      Assessment/Plan:  Nimo De Oliveira IS A 36 y.o. G0  1. Infertility counseling  -We discussed her ultrasound findings in detail. Impression: Normal-appearing uterus. Normal-appearing right adnexa. Left ovary with suspected small hemorrhagic cyst.  -Patient currently taking Mounjaro. We discussed discontinuation 2 months prior to trying to conceive.  -I highly encouraged the patient to follow-up with [facility] fertility given her age. Pamphlet was given for [facility] fertility with an associated phone number today.  -Patient is having regular monthly cycles. However, she is not using ovulation predictor kits. We discussed ovulation predictor kits and detail. 2. Recurrent UTI  -We discussed the differential diagnosis to the above in detail. Patient requesting medication intervention. We discussed the risk, benefits, indications, alternatives, side effects and possible complications Macrobid. Patient voiced understanding. All questions answered. Patient will return to office      Follow-up and Dispositions    Return for patient to return Mid May . Dee Dee Aguila MD

## 2023-05-19 ENCOUNTER — TELEPHONE (OUTPATIENT)
Age: 43
End: 2023-05-19

## 2023-05-19 RX ORDER — METRONIDAZOLE 500 MG/1
500 TABLET ORAL 2 TIMES DAILY
Qty: 14 TABLET | Refills: 0 | Status: SHIPPED | OUTPATIENT
Start: 2023-05-19 | End: 2023-05-19

## 2023-05-19 RX ORDER — METRONIDAZOLE 7.5 MG/G
1 GEL VAGINAL DAILY
Qty: 5 G | Refills: 0 | Status: SHIPPED | OUTPATIENT
Start: 2023-05-19 | End: 2023-05-24

## 2023-05-19 NOTE — TELEPHONE ENCOUNTER
Called patient name and  verified. patient advised flagyl sent.  Patient states she needs Metrogel she had gastric sleeve and the pills make her sick ok to send Metrogel instead      Please advise thank you

## 2023-05-19 NOTE — TELEPHONE ENCOUNTER
Patient calling name and  verified. Patient states she is having the start bv, she is not having a lot of discharge but she is having odor. She states she is at the start of her cycle and it usually occurs then, patient uses [facility] at [facility].       Please advise Thank you

## 2023-07-20 LAB — MAMMOGRAPHY, EXTERNAL: NORMAL

## 2023-07-27 ENCOUNTER — OFFICE VISIT (OUTPATIENT)
Age: 43
End: 2023-07-27
Payer: COMMERCIAL

## 2023-07-27 VITALS — SYSTOLIC BLOOD PRESSURE: 138 MMHG | DIASTOLIC BLOOD PRESSURE: 78 MMHG | BODY MASS INDEX: 33.66 KG/M2 | WEIGHT: 190 LBS

## 2023-07-27 DIAGNOSIS — Z01.419 ENCOUNTER FOR WELL WOMAN EXAM WITH ROUTINE GYNECOLOGICAL EXAM: Primary | ICD-10-CM

## 2023-07-27 DIAGNOSIS — E66.9 OBESITY (BMI 30-39.9): ICD-10-CM

## 2023-07-27 DIAGNOSIS — Z78.9 ATTEMPTING TO CONCEIVE: ICD-10-CM

## 2023-07-27 PROCEDURE — 99213 OFFICE O/P EST LOW 20 MIN: CPT | Performed by: OBSTETRICS & GYNECOLOGY

## 2023-07-27 PROCEDURE — 99396 PREV VISIT EST AGE 40-64: CPT | Performed by: OBSTETRICS & GYNECOLOGY

## 2023-07-27 RX ORDER — TIRZEPATIDE 5 MG/.5ML
INJECTION, SOLUTION SUBCUTANEOUS
COMMUNITY
Start: 2023-07-24

## 2023-07-27 RX ORDER — LETROZOLE 2.5 MG/1
2.5 TABLET, FILM COATED ORAL DAILY
Qty: 15 TABLET | Refills: 0 | Status: SHIPPED | OUTPATIENT
Start: 2023-07-27

## 2023-07-27 NOTE — PROGRESS NOTES
time  Mood and Affect: mood normal, affect appropriate      Assessment/Plan:  Alfredito Love is a 36 y.o. female presenting for her annual exam. Overall doing well. 1. Encounter for well woman exam with routine gynecological exam  - PAP IG, Aptima HPV and rfx 16/18,45 ([id number]) ([facility])    2. Obesity (BMI 30-39.9)  - [facility], Ratna Cervantes    3. Attempting to conceive  -dicussed TTC in detail. Regular monthly cycles. Using OPK \"sometimes\" patient requesting ovulation induction. We discussed the risk, SE and possible complications in detail. Femara sent today. Discussed use in detail. Follow up in three month or sooner with positive UPT       Well woman exam:  Normal gynecologic and breast exams. Healthy habits and lifestyle reviewed. Pap with HPV performed today. Patient declines STD screening. Mammogram up to date and WNL    Patient will follow up Return in about 3 months (around 10/27/2023).          Christophe Moses MD

## 2023-08-02 ENCOUNTER — TELEPHONE (OUTPATIENT)
Age: 43
End: 2023-08-02

## 2023-08-02 LAB
CYTOLOGIST CVX/VAG CYTO: NORMAL
CYTOLOGY CVX/VAG DOC CYTO: NORMAL
CYTOLOGY CVX/VAG DOC THIN PREP: NORMAL
DX ICD CODE: NORMAL
HPV GENOTYPE REFLEX: NORMAL
HPV I/H RISK 4 DNA CVX QL PROBE+SIG AMP: NORMAL
Lab: NORMAL
OTHER STN SPEC: NORMAL
STAT OF ADQ CVX/VAG CYTO-IMP: NORMAL

## 2023-10-20 ENCOUNTER — TELEPHONE (OUTPATIENT)
Age: 43
End: 2023-10-20

## 2023-10-20 NOTE — TELEPHONE ENCOUNTER
Patient called in, name and  verified. Patient is calling to inform us that she was seen in the ER yesterday at [facility] [place] and she was told that she had several complex cyst on on her left ovary. She denies any pain or discomfort in this area. She just wanted to make sure you were aware as she is coming into the office for Femara follow up on 2023. She did not know if you wanted to follow up with her concerning their findings. She also reports that she has been taking the Femara but has not conceived yet as they have not actively been trying.  Please let me know if you have any req's before her appt on 2023

## 2023-10-23 ENCOUNTER — TELEPHONE (OUTPATIENT)
Age: 43
End: 2023-10-23

## 2023-10-23 NOTE — TELEPHONE ENCOUNTER
Attempted to call patient on each number we have in the system. Both are disconnected. I have added an ultra sound to her next visit per Gael Mccurdy. I was attempting to call the pt to inform.

## 2023-11-07 NOTE — PROGRESS NOTES
Problem Visit    Lavonda Jeans is a 39 y.o. presenting for problem visit. Her main concern today is follow-up after initial visit for family planning. She took Femara x3 months (August - October). She has been out for a few months. She did see [facility] October 19th for ovarian cysts. She declined US today. Ob/Gyn Hx:  G1  LMP -   Menses - regular, monthly  Sexually active - Yes, male partner  Contraception - None     Health maintenance:  Pap - Negative, at [facility] in 2022  Mammo - 7/20/2023, Negative at [facility] [address]  Colonoscopy - no history of. Past Medical History:   Diagnosis Date    Anxiety 08/29/2013    HAS HAD PANIC ATTACKS, NUMEROUS IN PAST; NONE SINCE 2012    Anxiety     Anxiety     Asthma     Calculus of kidney     GERD (gastroesophageal reflux disease)     Hx: UTI (urinary tract infection)     patient states caused by kidney stones and being treated by PCP    Morbid obesity ([address])        Past Surgical History:   Procedure Laterality Date    BREAST REDUCTION SURGERY  2006    CHOLECYSTECTOMY, LAPAROSCOPIC      DILATION AND CURETTAGE OF UTERUS      GASTRECTOMY  9/12/13    lap sleeve gastrectomy by Dr Mik Hdez      R knee surgery 2005 ARTHROSCOPIC MENISCUS REPAIR    REFRACTIVE SURGERY         Family History   Problem Relation Age of Onset    Obesity Mother     Hypertension Father     No Known Problems Brother     Anesth Problems Neg Hx     Ovarian Cancer Mother        Social History     Socioeconomic History    Marital status: Single     Spouse name: Not on file    Number of children: Not on file    Years of education: HS grad    Highest education level: Not on file   Occupational History    Not on file   Tobacco Use    Smoking status: Never    Smokeless tobacco: Never   Vaping Use    Vaping Use: Never used   Substance and Sexual Activity    Alcohol use:  Yes     Alcohol/week: 0.0 standard drinks of alcohol    Drug use: No    Sexual activity: Yes     Partners: Male

## 2023-12-12 ENCOUNTER — TELEPHONE (OUTPATIENT)
Age: 43
End: 2023-12-12

## 2023-12-12 ENCOUNTER — OFFICE VISIT (OUTPATIENT)
Age: 43
End: 2023-12-12
Payer: MEDICAID

## 2023-12-12 VITALS — SYSTOLIC BLOOD PRESSURE: 102 MMHG | BODY MASS INDEX: 33.83 KG/M2 | WEIGHT: 191 LBS | DIASTOLIC BLOOD PRESSURE: 74 MMHG

## 2023-12-12 DIAGNOSIS — Z78.9 ATTEMPTING TO CONCEIVE: ICD-10-CM

## 2023-12-12 DIAGNOSIS — N83.209 CYST OF OVARY, UNSPECIFIED LATERALITY: Primary | ICD-10-CM

## 2023-12-12 PROCEDURE — 99214 OFFICE O/P EST MOD 30 MIN: CPT | Performed by: OBSTETRICS & GYNECOLOGY

## 2023-12-12 NOTE — TELEPHONE ENCOUNTER
Patient called in, name and  verified. Patient is calling as she was requesting (while she was in the office) a script for Nitrofurantoin due to her frequent UTI's after intercourse. She reports that endocrine had been prescribing it but she has not been seen by them in a while and is not to able to get ion with their office for some time. Are we able to fill this for her? Also she is requesting a referral to [facility] for her to be able to set up an appt with them for her HSG testing. Could we place this for her?

## 2023-12-13 DIAGNOSIS — N39.0 POSTCOITAL URINARY TRACT INFECTION: Primary | ICD-10-CM

## 2023-12-13 RX ORDER — NITROFURANTOIN MACROCRYSTALS 50 MG/1
50 CAPSULE ORAL PRN
Qty: 30 CAPSULE | Refills: 1 | Status: SHIPPED | OUTPATIENT
Start: 2023-12-13

## 2024-01-03 ENCOUNTER — TELEPHONE (OUTPATIENT)
Age: 44
End: 2024-01-03

## 2024-01-03 NOTE — TELEPHONE ENCOUNTER
Patient called in, name and  verified. Patient is calling in regards to having lab work ordered at her appointment tomorrow at her appt. She has been referred to [facility] for HSG testing and she was informed that she needs hormone testing in addition to that. Patient is wanting to know if she can have that done with us vs [facility] where she will be charged $500 due to their nature of practice and insurance not covering it. Please let me know what messages to relay to the pt. She can schedule an appt with them once she knows her blood work can be obtained with us. Thank you :)    She stated that you may know what hormone testing they are asking for?

## 2024-01-04 ENCOUNTER — OFFICE VISIT (OUTPATIENT)
Age: 44
End: 2024-01-04

## 2024-01-04 VITALS — WEIGHT: 196 LBS | DIASTOLIC BLOOD PRESSURE: 74 MMHG | BODY MASS INDEX: 34.72 KG/M2 | SYSTOLIC BLOOD PRESSURE: 116 MMHG

## 2024-01-04 DIAGNOSIS — N83.299 COMPLEX OVARIAN CYST: Primary | ICD-10-CM

## 2024-01-04 LAB — LDH SERPL L TO P-CCNC: 182 U/L (ref 81–246)

## 2024-01-04 NOTE — PROGRESS NOTES
Problem Visit    Jeanine Martinez is a 43 y.o. presenting for problem visit. Her main concern today is follow-up from US to discuss findings and plan of care. TVUS ordered for surveillance of enlarged left ovary with complex cyst seen via CT scan at [facility] 10/19/23. She has not been taking Femara.    US today demonstrates the following -   THE UTERUS IS ANTEVERTED, NORMAL IN SIZE AND ECHOGENICITY. THE ENDOMETRIUM MEASURES 9.4MM IN THICKNESS. NO MASSES OR ABNORMALITIES ARE SEEN.   RIGHT OVARY APPEARS WNL.   LEFT OVARY APPEARS TO HAVE A SIMPLE CYST ( 27 X 23 X 25 MM) WITH A NONVASCULAR SOLID COMPONENT PROTRUDING INTO THE CYST MEASURING 15 X 12 X 14 MM.   SMALL TRACE OF FREE FLUID IS SEEN IN THE CDS.    Ob/Gyn Hx:  G1  LMP -   Menses - regular, monthly  Sexually active - Yes, male partner  Contraception - None        Past Medical History:   Diagnosis Date    Anxiety 08/29/2013    HAS HAD PANIC ATTACKS, NUMEROUS IN PAST; NONE SINCE 2012    Anxiety     Anxiety     Asthma     Calculus of kidney     GERD (gastroesophageal reflux disease)     Hx: UTI (urinary tract infection)     patient states caused by kidney stones and being treated by PCP    Morbid obesity ([facility])        Past Surgical History:   Procedure Laterality Date    BREAST REDUCTION SURGERY  2006    CHOLECYSTECTOMY, LAPAROSCOPIC      DILATION AND CURETTAGE OF UTERUS      GASTRECTOMY  9/12/13    lap sleeve gastrectomy by Dr Gaston Serrano    ORTHOPEDIC SURGERY      R knee surgery 2005 ARTHROSCOPIC MENISCUS REPAIR    REFRACTIVE SURGERY         Family History   Problem Relation Age of Onset    Obesity Mother     Hypertension Father     No Known Problems Brother     Anesth Problems Neg Hx     Ovarian Cancer Mother        Social History     Socioeconomic History    Marital status: Single     Spouse name: Not on file    Number of children: Not on file    Years of education: HS grad    Highest education level: Not on file   Occupational History    Not on file   Tobacco Use

## 2024-01-05 LAB
CANCER AG125 SERPL-ACNC: 13 U/ML (ref 1.5–35)
CEA SERPL-MCNC: 2.1 NG/ML
ESTRADIOL SERPL-MCNC: 196.6 PG/ML

## 2024-01-06 LAB — CANCER AG19-9 SERPL-ACNC: 18 U/ML (ref 0–35)

## 2024-01-08 LAB
AFP L3 MFR SERPL: NORMAL % (ref 0–9.9)
AFP SERPL-MCNC: 1.8 NG/ML (ref 0–6.4)
INHIBIN A SERPL-MCNC: 31.7 PG/ML
INHIBIN B SERPL-MCNC: 67.6 PG/ML

## 2024-01-12 ENCOUNTER — TELEPHONE (OUTPATIENT)
Age: 44
End: 2024-01-12

## 2024-01-12 NOTE — TELEPHONE ENCOUNTER
Patient called in, name and  verified. Patient is calling in regards to her most recent labs. Pt is wanting to know when we can schedule her surgery given the results from her labs. Patient has no schedule restrictions.

## 2024-01-15 ENCOUNTER — PREP FOR PROCEDURE (OUTPATIENT)
Facility: HOSPITAL | Age: 44
End: 2024-01-15

## 2024-01-15 DIAGNOSIS — N83.299 COMPLEX OVARIAN CYST: Primary | ICD-10-CM

## 2024-02-06 RX ORDER — CALCIUM CARBONATE 500(1250)
500 TABLET ORAL DAILY
COMMUNITY

## 2024-02-06 NOTE — PERIOP NOTE
PAT PHONE INTERVIEW COMPLETED WITH PT AND INSTRUCTED AS BELOW. ALL QUESTIONS ANSWERED. INSTRUCTIONS SENT TO PT'S EMAILED PER PT REQUEST.    [facility]  PREOPERATIVE INSTRUCTIONS    Surgery Date:   2/9/24    Your surgeon's office or [facility] staff will call you between 4 PM- 8 PM the day before surgery with your arrival time. If your surgery is on a Monday, you will receive a call the preceding Friday. If your surgeon;s office has given you arrival time, then go by that time.    Please report to [facility] Patient Access/Admitting on the 1st floor.  Bring your insurance card, photo identification, and any copayment ( if applicable).   If you are going home the same day of your surgery, you must have a responsible adult to drive you home. You need to have a responsible adult to stay with you the first 24 hours after surgery and you should not drive a car for 24 hours following your surgery.  If you are being admitted to the hospital, please leave personal belongings/luggage in your car until you have an assigned hospital room number.  Nothing to eat or drink after midnight the night before surgery. This includes no water, gum, mints, coffee, juice, etc.  Please note special instructions, if applicable, below for medications.  Do NOT drink alcohol or smoke 24 hours before surgery. STOP smoking for 14 days prior as it helps with breathing and healing after surgery.  Please wear comfortable clothes. Wear glasses instead of contacts. We ask that all money and jewelry and valuables to be left at home. Wear no makeup, particularly mascara the day of surgery.  All body piercings, rings, and jewelry need to be removed and left at home. Remove all nail polish except for clear. Please wear your hair loose or down. Please no pony-tails, buns, or any metal hair accessories. You may wear deodorant, unless having breast surgery. Do not shave any body area within 24 hours of your surgery.  Please follow all instructions  shower with CHG soap (Chorhexidine gluconate 4%) two times before surgery.    CHG soap (Hibiclens, Hex-A-Clens or store brand)  CHG soap will be provided at your Preadmission Testing (PAT) appointment.  If you do not have a PAT appointment before surgery, you may arrange to  CHG soap from our office or purchase CHG soap at a pharmacy, grocery or department store.  You need to purchase TWO 4 ounce bottles to use for your 2 showers.    Steps to follow:  Wash your hair with your normal shampoo and your body with regular soap and rinse well to remove shampoo and soap from your skin.  Wet a clean washcloth and turn off the shower.  Put CHG soap on washcloth and apply to your entire body from the neck down. Do not use on your head, face or private parts(genitals). Do not use CHG soap on open sores, wounds or areas of skin irritation.  Wash you body gently for 5 minutes. Do not wash your skin too hard. This soap does not create lather. Pay special attention to your underarms and from your belly button to your feet.  Turn the shower back on and rinse well to get CHG soap off your body.  Pat your skin dry with a clean, dry towel. Do not apply lotions or moisturizer.  Put on clean clothes and sleep on fresh bed sheets and do not allow pets to sleep with you.    Shower with CHG soap 2 times before your surgery  The evening before your surgery  The morning of your surgery    YOU MAY ALSO SHOWER WITH DIAL ANTIBACTERIAL SOAP THE EVENING BEFORE YOUR SURGERY AND THE MORNING OF YOUR SURGERY.    Tips to help prevent infections after your surgery:  Protect your surgical wound from germs:  Hand washing is the most important thing you and your caregivers can do to prevent infections.  Keep your bandage clean and dry!  Do not touch your surgical wound.  Use clean, freshly washed towels and washcloths every time you shower; do not share bath linens with others.  Until your surgical wound is healed, wear clothing and sleep on bed

## 2024-02-08 ENCOUNTER — ANESTHESIA EVENT (OUTPATIENT)
Facility: HOSPITAL | Age: 44
End: 2024-02-08
Payer: COMMERCIAL

## 2024-02-09 ENCOUNTER — ANESTHESIA (OUTPATIENT)
Facility: HOSPITAL | Age: 44
End: 2024-02-09
Payer: COMMERCIAL

## 2024-02-09 ENCOUNTER — HOSPITAL ENCOUNTER (OUTPATIENT)
Facility: HOSPITAL | Age: 44
Setting detail: OUTPATIENT SURGERY
Discharge: HOME | End: 2024-02-09
Attending: OBSTETRICS & GYNECOLOGY | Admitting: OBSTETRICS & GYNECOLOGY
Payer: COMMERCIAL

## 2024-02-09 VITALS
OXYGEN SATURATION: 97 % | TEMPERATURE: 98.3 F | RESPIRATION RATE: 13 BRPM | HEIGHT: 63 IN | SYSTOLIC BLOOD PRESSURE: 115 MMHG | WEIGHT: 195 LBS | DIASTOLIC BLOOD PRESSURE: 68 MMHG | HEART RATE: 51 BPM | BODY MASS INDEX: 34.55 KG/M2

## 2024-02-09 DIAGNOSIS — Z98.890 S/P LAPAROSCOPY: Primary | ICD-10-CM

## 2024-02-09 DIAGNOSIS — N83.299 COMPLEX OVARIAN CYST: ICD-10-CM

## 2024-02-09 LAB — HCG UR QL: NEGATIVE

## 2024-02-09 PROCEDURE — 7100000001 HC PACU RECOVERY - ADDTL 15 MIN: Performed by: OBSTETRICS & GYNECOLOGY

## 2024-02-09 PROCEDURE — 7100000000 HC PACU RECOVERY - FIRST 15 MIN: Performed by: OBSTETRICS & GYNECOLOGY

## 2024-02-09 PROCEDURE — 88307 TISSUE EXAM BY PATHOLOGIST: CPT

## 2024-02-09 PROCEDURE — 3600000004 HC SURGERY LEVEL 4 BASE: Performed by: OBSTETRICS & GYNECOLOGY

## 2024-02-09 PROCEDURE — 2500000003 HC RX 250 WO HCPCS: Performed by: NURSE ANESTHETIST, CERTIFIED REGISTERED

## 2024-02-09 PROCEDURE — 3700000000 HC ANESTHESIA ATTENDED CARE: Performed by: OBSTETRICS & GYNECOLOGY

## 2024-02-09 PROCEDURE — 7100000010 HC PHASE II RECOVERY - FIRST 15 MIN: Performed by: OBSTETRICS & GYNECOLOGY

## 2024-02-09 PROCEDURE — 3600000014 HC SURGERY LEVEL 4 ADDTL 15MIN: Performed by: OBSTETRICS & GYNECOLOGY

## 2024-02-09 PROCEDURE — 6360000002 HC RX W HCPCS: Performed by: ANESTHESIOLOGY

## 2024-02-09 PROCEDURE — 3700000001 HC ADD 15 MINUTES (ANESTHESIA): Performed by: OBSTETRICS & GYNECOLOGY

## 2024-02-09 PROCEDURE — 6370000000 HC RX 637 (ALT 250 FOR IP): Performed by: ANESTHESIOLOGY

## 2024-02-09 PROCEDURE — 7100000011 HC PHASE II RECOVERY - ADDTL 15 MIN: Performed by: OBSTETRICS & GYNECOLOGY

## 2024-02-09 PROCEDURE — 2709999900 HC NON-CHARGEABLE SUPPLY: Performed by: OBSTETRICS & GYNECOLOGY

## 2024-02-09 PROCEDURE — 88300 SURGICAL PATH GROSS: CPT

## 2024-02-09 PROCEDURE — 2580000003 HC RX 258: Performed by: ANESTHESIOLOGY

## 2024-02-09 PROCEDURE — 2580000003 HC RX 258: Performed by: OBSTETRICS & GYNECOLOGY

## 2024-02-09 PROCEDURE — 6360000002 HC RX W HCPCS: Performed by: NURSE ANESTHETIST, CERTIFIED REGISTERED

## 2024-02-09 PROCEDURE — 88112 CYTOPATH CELL ENHANCE TECH: CPT

## 2024-02-09 PROCEDURE — 6360000002 HC RX W HCPCS: Performed by: OBSTETRICS & GYNECOLOGY

## 2024-02-09 PROCEDURE — 2500000003 HC RX 250 WO HCPCS: Performed by: OBSTETRICS & GYNECOLOGY

## 2024-02-09 PROCEDURE — 81025 URINE PREGNANCY TEST: CPT

## 2024-02-09 RX ORDER — KETOROLAC TROMETHAMINE 30 MG/ML
INJECTION, SOLUTION INTRAMUSCULAR; INTRAVENOUS PRN
Status: DISCONTINUED | OUTPATIENT
Start: 2024-02-09 | End: 2024-02-09 | Stop reason: SDUPTHER

## 2024-02-09 RX ORDER — LIDOCAINE HYDROCHLORIDE 20 MG/ML
INJECTION, SOLUTION EPIDURAL; INFILTRATION; INTRACAUDAL; PERINEURAL PRN
Status: DISCONTINUED | OUTPATIENT
Start: 2024-02-09 | End: 2024-02-09 | Stop reason: SDUPTHER

## 2024-02-09 RX ORDER — ROCURONIUM BROMIDE 10 MG/ML
INJECTION, SOLUTION INTRAVENOUS PRN
Status: DISCONTINUED | OUTPATIENT
Start: 2024-02-09 | End: 2024-02-09 | Stop reason: SDUPTHER

## 2024-02-09 RX ORDER — SODIUM CHLORIDE 9 MG/ML
INJECTION, SOLUTION INTRAVENOUS PRN
Status: DISCONTINUED | OUTPATIENT
Start: 2024-02-09 | End: 2024-02-09 | Stop reason: HOSPADM

## 2024-02-09 RX ORDER — SODIUM CHLORIDE, SODIUM LACTATE, POTASSIUM CHLORIDE, CALCIUM CHLORIDE 600; 310; 30; 20 MG/100ML; MG/100ML; MG/100ML; MG/100ML
INJECTION, SOLUTION INTRAVENOUS CONTINUOUS
Status: DISCONTINUED | OUTPATIENT
Start: 2024-02-09 | End: 2024-02-09 | Stop reason: HOSPADM

## 2024-02-09 RX ORDER — ACETAMINOPHEN 500 MG
1000 TABLET ORAL ONCE
Status: COMPLETED | OUTPATIENT
Start: 2024-02-09 | End: 2024-02-09

## 2024-02-09 RX ORDER — SODIUM CHLORIDE 0.9 % (FLUSH) 0.9 %
5-40 SYRINGE (ML) INJECTION EVERY 12 HOURS SCHEDULED
Status: DISCONTINUED | OUTPATIENT
Start: 2024-02-09 | End: 2024-02-09 | Stop reason: HOSPADM

## 2024-02-09 RX ORDER — SODIUM CHLORIDE 0.9 % (FLUSH) 0.9 %
5-40 SYRINGE (ML) INJECTION PRN
Status: DISCONTINUED | OUTPATIENT
Start: 2024-02-09 | End: 2024-02-09 | Stop reason: HOSPADM

## 2024-02-09 RX ORDER — OXYCODONE HYDROCHLORIDE 5 MG/1
5 TABLET ORAL
Status: COMPLETED | OUTPATIENT
Start: 2024-02-09 | End: 2024-02-09

## 2024-02-09 RX ORDER — ONDANSETRON 2 MG/ML
4 INJECTION INTRAMUSCULAR; INTRAVENOUS AS NEEDED
Status: DISCONTINUED | OUTPATIENT
Start: 2024-02-09 | End: 2024-02-09 | Stop reason: HOSPADM

## 2024-02-09 RX ORDER — HYDROMORPHONE HYDROCHLORIDE 1 MG/ML
0.5 INJECTION, SOLUTION INTRAMUSCULAR; INTRAVENOUS; SUBCUTANEOUS EVERY 5 MIN PRN
Status: DISCONTINUED | OUTPATIENT
Start: 2024-02-09 | End: 2024-02-09 | Stop reason: HOSPADM

## 2024-02-09 RX ORDER — FENTANYL CITRATE 50 UG/ML
100 INJECTION, SOLUTION INTRAMUSCULAR; INTRAVENOUS
Status: DISCONTINUED | OUTPATIENT
Start: 2024-02-09 | End: 2024-02-09 | Stop reason: HOSPADM

## 2024-02-09 RX ORDER — MIDAZOLAM HYDROCHLORIDE 2 MG/2ML
2 INJECTION, SOLUTION INTRAMUSCULAR; INTRAVENOUS
Status: DISCONTINUED | OUTPATIENT
Start: 2024-02-09 | End: 2024-02-09 | Stop reason: HOSPADM

## 2024-02-09 RX ORDER — LIDOCAINE HYDROCHLORIDE 10 MG/ML
1 INJECTION, SOLUTION EPIDURAL; INFILTRATION; INTRACAUDAL; PERINEURAL
Status: DISCONTINUED | OUTPATIENT
Start: 2024-02-09 | End: 2024-02-09 | Stop reason: HOSPADM

## 2024-02-09 RX ORDER — ONDANSETRON 2 MG/ML
INJECTION INTRAMUSCULAR; INTRAVENOUS PRN
Status: DISCONTINUED | OUTPATIENT
Start: 2024-02-09 | End: 2024-02-09 | Stop reason: SDUPTHER

## 2024-02-09 RX ORDER — DEXMEDETOMIDINE HYDROCHLORIDE 100 UG/ML
INJECTION, SOLUTION INTRAVENOUS PRN
Status: DISCONTINUED | OUTPATIENT
Start: 2024-02-09 | End: 2024-02-09 | Stop reason: SDUPTHER

## 2024-02-09 RX ORDER — ONDANSETRON 2 MG/ML
4 INJECTION INTRAMUSCULAR; INTRAVENOUS
Status: COMPLETED | OUTPATIENT
Start: 2024-02-09 | End: 2024-02-09

## 2024-02-09 RX ORDER — FENTANYL CITRATE 50 UG/ML
INJECTION, SOLUTION INTRAMUSCULAR; INTRAVENOUS PRN
Status: DISCONTINUED | OUTPATIENT
Start: 2024-02-09 | End: 2024-02-09 | Stop reason: SDUPTHER

## 2024-02-09 RX ORDER — HYDRALAZINE HYDROCHLORIDE 20 MG/ML
10 INJECTION INTRAMUSCULAR; INTRAVENOUS
Status: DISCONTINUED | OUTPATIENT
Start: 2024-02-09 | End: 2024-02-09 | Stop reason: HOSPADM

## 2024-02-09 RX ORDER — IBUPROFEN 800 MG/1
800 TABLET ORAL EVERY 8 HOURS PRN
Qty: 120 TABLET | Refills: 1 | Status: SHIPPED | OUTPATIENT
Start: 2024-02-09

## 2024-02-09 RX ORDER — OXYCODONE HYDROCHLORIDE 5 MG/1
5 TABLET ORAL EVERY 6 HOURS PRN
Qty: 12 TABLET | Refills: 0 | Status: SHIPPED | OUTPATIENT
Start: 2024-02-09 | End: 2024-02-12

## 2024-02-09 RX ORDER — DEXAMETHASONE SODIUM PHOSPHATE 4 MG/ML
INJECTION, SOLUTION INTRA-ARTICULAR; INTRALESIONAL; INTRAMUSCULAR; INTRAVENOUS; SOFT TISSUE PRN
Status: DISCONTINUED | OUTPATIENT
Start: 2024-02-09 | End: 2024-02-09 | Stop reason: SDUPTHER

## 2024-02-09 RX ORDER — BUPIVACAINE HYDROCHLORIDE AND EPINEPHRINE 5; 5 MG/ML; UG/ML
INJECTION, SOLUTION EPIDURAL; INTRACAUDAL; PERINEURAL PRN
Status: DISCONTINUED | OUTPATIENT
Start: 2024-02-09 | End: 2024-02-09 | Stop reason: HOSPADM

## 2024-02-09 RX ORDER — PROCHLORPERAZINE EDISYLATE 5 MG/ML
5 INJECTION INTRAMUSCULAR; INTRAVENOUS
Status: DISCONTINUED | OUTPATIENT
Start: 2024-02-09 | End: 2024-02-09 | Stop reason: HOSPADM

## 2024-02-09 RX ORDER — MIDAZOLAM HYDROCHLORIDE 1 MG/ML
INJECTION INTRAMUSCULAR; INTRAVENOUS PRN
Status: DISCONTINUED | OUTPATIENT
Start: 2024-02-09 | End: 2024-02-09 | Stop reason: SDUPTHER

## 2024-02-09 RX ORDER — FENTANYL CITRATE 50 UG/ML
25 INJECTION, SOLUTION INTRAMUSCULAR; INTRAVENOUS EVERY 5 MIN PRN
Status: DISCONTINUED | OUTPATIENT
Start: 2024-02-09 | End: 2024-02-09 | Stop reason: HOSPADM

## 2024-02-09 RX ORDER — ACETAMINOPHEN 500 MG
1000 TABLET ORAL EVERY 8 HOURS PRN
Qty: 360 TABLET | Refills: 1 | Status: SHIPPED | OUTPATIENT
Start: 2024-02-09

## 2024-02-09 RX ADMIN — DEXMEDETOMIDINE HYDROCHLORIDE 10 MCG: 100 INJECTION, SOLUTION, CONCENTRATE INTRAVENOUS at 15:47

## 2024-02-09 RX ADMIN — FENTANYL CITRATE 25 MCG: 50 INJECTION INTRAMUSCULAR; INTRAVENOUS at 16:13

## 2024-02-09 RX ADMIN — DEXAMETHASONE SODIUM PHOSPHATE 4 MG: 4 INJECTION, SOLUTION INTRAMUSCULAR; INTRAVENOUS at 14:20

## 2024-02-09 RX ADMIN — WATER 2000 MG: 1 INJECTION INTRAMUSCULAR; INTRAVENOUS; SUBCUTANEOUS at 14:22

## 2024-02-09 RX ADMIN — MIDAZOLAM HYDROCHLORIDE 2 MG: 1 INJECTION, SOLUTION INTRAMUSCULAR; INTRAVENOUS at 14:08

## 2024-02-09 RX ADMIN — HYDROMORPHONE HYDROCHLORIDE 0.5 MG: 1 INJECTION, SOLUTION INTRAMUSCULAR; INTRAVENOUS; SUBCUTANEOUS at 15:26

## 2024-02-09 RX ADMIN — MIDAZOLAM HYDROCHLORIDE 3 MG: 1 INJECTION, SOLUTION INTRAMUSCULAR; INTRAVENOUS at 14:03

## 2024-02-09 RX ADMIN — ONDANSETRON 4 MG: 2 INJECTION INTRAMUSCULAR; INTRAVENOUS at 16:38

## 2024-02-09 RX ADMIN — SUGAMMADEX 200 MG: 100 INJECTION, SOLUTION INTRAVENOUS at 15:38

## 2024-02-09 RX ADMIN — FENTANYL CITRATE 100 MCG: 50 INJECTION, SOLUTION INTRAMUSCULAR; INTRAVENOUS at 14:10

## 2024-02-09 RX ADMIN — LIDOCAINE HYDROCHLORIDE 80 MG: 20 INJECTION, SOLUTION EPIDURAL; INFILTRATION; INTRACAUDAL; PERINEURAL at 14:10

## 2024-02-09 RX ADMIN — ROCURONIUM BROMIDE 40 MG: 10 SOLUTION INTRAVENOUS at 14:10

## 2024-02-09 RX ADMIN — OXYCODONE 5 MG: 5 TABLET ORAL at 17:23

## 2024-02-09 RX ADMIN — KETOROLAC TROMETHAMINE 30 MG: 30 INJECTION, SOLUTION INTRAMUSCULAR; INTRAVENOUS at 15:31

## 2024-02-09 RX ADMIN — ONDANSETRON 4 MG: 2 INJECTION INTRAMUSCULAR; INTRAVENOUS at 15:28

## 2024-02-09 RX ADMIN — SODIUM CHLORIDE, POTASSIUM CHLORIDE, SODIUM LACTATE AND CALCIUM CHLORIDE: 600; 310; 30; 20 INJECTION, SOLUTION INTRAVENOUS at 12:45

## 2024-02-09 RX ADMIN — FENTANYL CITRATE 25 MCG: 50 INJECTION INTRAMUSCULAR; INTRAVENOUS at 16:34

## 2024-02-09 RX ADMIN — ACETAMINOPHEN 1000 MG: 500 TABLET ORAL at 12:46

## 2024-02-09 RX ADMIN — HYDROMORPHONE HYDROCHLORIDE 0.5 MG: 1 INJECTION, SOLUTION INTRAMUSCULAR; INTRAVENOUS; SUBCUTANEOUS at 14:23

## 2024-02-09 RX ADMIN — PROPOFOL 150 MG: 10 INJECTION, EMULSION INTRAVENOUS at 14:10

## 2024-02-09 ASSESSMENT — PAIN DESCRIPTION - LOCATION
LOCATION: ABDOMEN

## 2024-02-09 ASSESSMENT — PAIN SCALES - GENERAL
PAINLEVEL_OUTOF10: 5
PAINLEVEL_OUTOF10: 6
PAINLEVEL_OUTOF10: 6

## 2024-02-09 ASSESSMENT — PAIN DESCRIPTION - DESCRIPTORS
DESCRIPTORS: CRAMPING;ACHING
DESCRIPTORS: ACHING
DESCRIPTORS: ACHING

## 2024-02-09 ASSESSMENT — PAIN - FUNCTIONAL ASSESSMENT: PAIN_FUNCTIONAL_ASSESSMENT: NONE - DENIES PAIN

## 2024-02-09 NOTE — DISCHARGE INSTRUCTIONS
______________________________________________________________________    Anesthesia Discharge Instructions    After general anesthesia or intervenous sedation, for 24 hours or while taking prescription Narcotics:  Limit your activities  Do not drive or operate hazardous machinery  If you have not urinated within 8 hours after discharge, please contact your surgeon on call.  Do not make important personal or business decisions  Do not drink alcoholic beverages    Report the following to your surgeon:  Excessive pain, swelling, redness or odor of or around the surgical area  Temperature over 100.5 degrees  Nausea and vomiting lasting longer than 4 hours or if unable to take medication  Any signs of decreased circulation or nerve impairment to extremity:  Change in color, persistent numbness, tingling, coldness or increased pain.  Any questions

## 2024-02-09 NOTE — ANESTHESIA PRE PROCEDURE
2005 ARTHROSCOPIC MENISCUS REPAIR   • REFRACTIVE SURGERY Bilateral        Social History:    Social History     Tobacco Use   • Smoking status: Never   • Smokeless tobacco: Never   Substance Use Topics   • Alcohol use: Yes     Comment: OCCASSIONALLY                                Counseling given: Not Answered      Vital Signs (Current):   Vitals:    02/06/24 1204 02/09/24 1207   Weight: 88.5 kg (195 lb) 88.5 kg (195 lb)   Height: 1.6 m (5' 3\") 1.6 m (5' 3\")                                              BP Readings from Last 3 Encounters:   01/04/24 116/74   12/12/23 102/74   07/27/23 138/78       NPO Status: Time of last liquid consumption: 2340                        Time of last solid consumption: 2340                        Date of last liquid consumption: 02/08/24                        Date of last solid food consumption: 02/08/24    BMI:   Wt Readings from Last 3 Encounters:   02/09/24 88.5 kg (195 lb)   01/04/24 88.9 kg (196 lb)   12/12/23 86.6 kg (191 lb)     Body mass index is 34.54 kg/m².    CBC:   Lab Results   Component Value Date/Time    WBC 3.6 12/27/2022 12:05 PM    RBC 4.10 12/27/2022 12:05 PM    HGB 11.4 12/27/2022 12:05 PM    HCT 35.9 12/27/2022 12:05 PM    MCV 87.6 12/27/2022 12:05 PM    RDW 13.6 12/27/2022 12:05 PM     12/27/2022 12:05 PM       CMP:   Lab Results   Component Value Date/Time     12/27/2022 12:05 PM    K 4.1 12/27/2022 12:05 PM     12/27/2022 12:05 PM    CO2 30 12/27/2022 12:05 PM    BUN 12 12/27/2022 12:05 PM    CREATININE 0.83 12/27/2022 12:05 PM    GFRAA 87 10/14/2019 07:43 AM    AGRATIO 1.2 12/27/2022 12:05 PM    GLUCOSE 93 12/27/2022 12:05 PM    PROT 6.5 12/27/2022 12:05 PM    CALCIUM 8.5 12/27/2022 12:05 PM    BILITOT 0.3 12/27/2022 12:05 PM    ALKPHOS 102 12/27/2022 12:05 PM    AST 13 12/27/2022 12:05 PM    ALT 14 12/27/2022 12:05 PM       POC Tests: No results for input(s): \"POCGLU\", \"POCNA\", \"POCK\", \"POCCL\", \"POCBUN\", \"POCHEMO\", \"POCHCT\" in the last 72

## 2024-02-09 NOTE — ANESTHESIA POSTPROCEDURE EVALUATION
.    Signed By: Leo Maria MD    2/9/2024    Multimodal analgesia pain management approach  Pain management: satisfactory to patient    No notable events documented.

## 2024-02-09 NOTE — PROGRESS NOTES
Discharge instructions reviewed with patient and family using teachback. All questions have been answered. Prescriptions sent to [facility] pharmacy. Vital signs stable, pain appropriately managed. Patient wheeled off the unit with PACU staff.

## 2024-02-09 NOTE — H&P
fallopian tube which would result in her being sterilized. The patient has been trying to conceive. She understand that the best chance at her becoming pregnant would be through IVF. We discussed that IVF is still possible. However, after this surgery today she would be sterilized and therefore be unable to conceive naturally. She and her partner who is at the bedside voiced understanding after being fully consented. If for some reason her right fallopian tube does remain in place we can perform chromopertubation on that tube at her request.         Plan:     Procedure(s) (LRB):  LAPAROSCOPIC, UNILATERAL SALPINGO OOPHORECTOMY (N/A), PELVIC WASHINGS, POSSIBLE CHROMOPERTUBATION. (N/A)  Discussed the risks of surgery including the risks of bleeding, infection, deep vein thrombosis, and surgical injuries to internal organs including but not limited to the bowels, bladder, rectum, and female reproductive organs. The patient understands the risks; any and all questions were answered to the patient's satisfaction.    Signed By:  Ashwini Jones MD     February 9, 2024

## 2024-02-09 NOTE — BRIEF OP NOTE
Brief Postoperative Note      Patient: Jeanine Martinez  YOB: 1980  MRN: [id number]    Date of Procedure: 2/9/2024    Pre-Op Diagnosis Codes:     * Complex ovarian cyst [N83.299]    Post-Op Diagnosis: Same       Procedure(s):  LAPAROSCOPIC LEFT SALPINGO OOPHORECTOMY PELVIC WASHINGS CHROMOPERTUBATION     Surgeon(s):  Ashwini Jones MD Peng, Katherine, MD    Assistant:  Dr. CHARLTON, the assistant surgeon, was present during the procedure.  The physician's presence was necessary due to OVARIAN CYST, MULTIPLE PREVIOUS SURGERIES.The assistant surgeon performed significant portions of the surgery, including incising tissue, clamping, control of bleeding with suturing and cauterization, and decision making at challenging portions of the procedure.  This assistance was necessary to accomplish the optimal outcome for the patient, above and beyond what a non-MD assistant could have provided.    Anesthesia: General    Estimated Blood Loss (mL): less than 50     Complications: None    Specimens:   ID Type Source Tests Collected by Time Destination   1 : pelvic washing Body Fluid Pelvic Washings CYTOLOGY, NON-GYN Ashwini Jones MD 2/9/2024 1448    2 : pelvic stones Tissue Uterus SURGICAL PATHOLOGY Ashwini Jones MD 2/9/2024 1449    3 : left tube and ovary Tissue Fallopian Tube SURGICAL PATHOLOGY Ashwini Jones MD 2/9/2024 1521        Implants:  NONE       Drains:   [REMOVED] Urinary Catheter 02/09/24 2 Way;Gonzalez (Removed)       Findings: NORMAL APPEARING UTERUS WITH WITH SMALL SUBSEROSAL FIBROID AT THE FUNDUS. RIGHT FALLOPIAN TUBE PRESENT BUT MISSING FIMBRIATED END. RIGHT OVARY GROSSLY NORMAL. RIGHT TUBE NOT PATENT WITH CHROMOPERTUBATION. LEFT OVARY ENLARGED. LEFT TUBE GROSSLY NORMAL. APPENDIX NOT VISUALIZED. RIGHT UPPER QUADRANT GROSSLY NORMAL.       Electronically signed by Ashwini Jones MD on 2/9/2024 at 4:11 PM

## 2024-02-10 NOTE — OP NOTE
[facility]  OPERATIVE REPORT    Name:  KEVAN CASTREJON  MR#:  [id number]  :  1980  ACCOUNT #:  [id number]  DATE OF SERVICE:  2024    PREOPERATIVE DIAGNOSIS:  Complex left ovarian cyst.    POSTOPERATIVE DIAGNOSIS:  Complex left ovarian cyst.    PROCEDURE PERFORMED:  Laparoscopic left salpingo-oophorectomy, pelvic washings, and chromopertubation.    SURGEON:  Ashwini Jones MD    ASSISTANT:  Maria Isabel Mohr MD    ANESTHESIA:  ***    COMPLICATIONS:  None.    SPECIMENS REMOVED:  Pelvic washings, pelvic stones, and left fallopian tube and ovary.    IMPLANTS:  None.    DRAINS:  Gonzalez to gravity, with approximately clean 300 mL at the end of the case.    ESTIMATED BLOOD LOSS:  Less than 50 mL.    FINDINGS:  Normal-appearing uterus with small subserosal fibroid at the fundal portion, right fallopian tube present but missing fimbriated end, right ovary grossly normal, right fallopian tube not patent with chromopertubation, left ovary enlarged, left fallopian tube grossly normal, appendix not visualized, right upper quadrant grossly normal.    PROCEDURE:  After informed consent was obtained, the patient was taken to the operating room and placed under satisfactory anesthesia.  She was placed in the dorsal lithotomy position with her legs in stirrups.  She was prepped and draped in normal sterile fashion.  Time-out was performed to ensure correct patient and correct procedure.  A speculum was placed in the vagina.  The anterior lip of the cervix was grasped with a single-tooth tenaculum.  The cervix was serially dilated to accommodate a diagnostic Vcare.  Diagnostic VCare was not able to be placed due to cervical stenosis; therefore, the Gonzalez catheter was placed, then gown and gloves were changed.    Attention was turned to the abdomen, where a 5 mm periumbilical incision was made in the natural folds of the umbilicus.  Veress needle was used to obtain access into the intraperitoneal cavity.

## 2024-02-12 ENCOUNTER — TELEPHONE (OUTPATIENT)
Age: 44
End: 2024-02-12

## 2024-02-12 NOTE — TELEPHONE ENCOUNTER
Patient called in, name and  verified. Patient is calling as she has some questions regarding her recent procedure. She has questions about what was actually removed during the procedure. Her concerns revolve around fertility. Could you reach out to her once you have a chance?

## 2024-02-16 ENCOUNTER — TELEPHONE (OUTPATIENT)
Age: 44
End: 2024-02-16

## 2024-02-16 NOTE — TELEPHONE ENCOUNTER
Patient calling to find out when she is cleared to drive again since her procedure last Friday 2/9/2024.

## 2024-02-23 ENCOUNTER — OFFICE VISIT (OUTPATIENT)
Age: 44
End: 2024-02-23

## 2024-02-23 VITALS — SYSTOLIC BLOOD PRESSURE: 108 MMHG | BODY MASS INDEX: 35.96 KG/M2 | WEIGHT: 203 LBS | DIASTOLIC BLOOD PRESSURE: 64 MMHG

## 2024-02-23 DIAGNOSIS — Z09 POSTOPERATIVE FOLLOW-UP: Primary | ICD-10-CM

## 2024-02-23 DIAGNOSIS — N39.0 POSTCOITAL URINARY TRACT INFECTION: ICD-10-CM

## 2024-02-23 PROCEDURE — 99024 POSTOP FOLLOW-UP VISIT: CPT | Performed by: OBSTETRICS & GYNECOLOGY

## 2024-02-23 RX ORDER — NITROFURANTOIN MACROCRYSTALS 50 MG/1
50 CAPSULE ORAL PRN
Qty: 30 CAPSULE | Refills: 4 | Status: SHIPPED | OUTPATIENT
Start: 2024-02-23

## 2024-02-23 NOTE — PROGRESS NOTES
Problem Visit    Jeanine Martinez is a 43 y.o. presenting for problem visit. Patient is here for a follow up from a laparoscopic left salpingo oophrectomy on 2/9/2024.    Patient has no concerns or complaints at this time and denies any pain.      FINAL PATHOLOGIC DIAGNOSIS     1.  Pelvic stones, removal:        Calculi, grossly identified     2.  Left fallopian tube and ovary, left salpingo-oophorectomy:        Mucinous cystadenoma        Benign fallopian tube         Past Medical History:   Diagnosis Date    Anxiety 08/29/2013    HAS HAD PANIC ATTACKS, NUMEROUS IN PAST; NONE SINCE 2012    Asthma     Calculus of kidney     Gallstones     GERD (gastroesophageal reflux disease)     Hx: UTI (urinary tract infection)     patient states caused by kidney stones and being treated by PCP    Morbid obesity (HCC)        Past Surgical History:   Procedure Laterality Date    BREAST REDUCTION SURGERY Bilateral 2006    CHOLECYSTECTOMY, LAPAROSCOPIC      DILATION AND CURETTAGE OF UTERUS      GASTRECTOMY  9/12/13    lap sleeve gastrectomy by Dr Gaston Serrano    ORTHOPEDIC SURGERY      R knee surgery 2005 ARTHROSCOPIC MENISCUS REPAIR    REFRACTIVE SURGERY Bilateral     SALPINGO-OOPHORECTOMY N/A 2/9/2024    LAPAROSCOPIC LEFT SALPINGO OOPHORECTOMY performed by [facility]wini [facility] MD at [facility] MAIN OR       Family History   Problem Relation Age of Onset    Obesity Mother     Hypertension Father     No Known Problems Brother     Anesth Problems Neg Hx     Ovarian Cancer Mother        Social History     Socioeconomic History    Marital status: Single     Spouse name: Not on file    Number of children: Not on file    Years of education: HS grad    Highest education level: Not on file   Occupational History    Not on file   Tobacco Use    Smoking status: Never    Smokeless tobacco: Never   Vaping Use    Vaping Use: Never used   Substance and Sexual Activity    Alcohol use: Yes     Comment: OCCASSIONALLY    Drug use: No    Sexual activity: Yes

## 2024-04-03 ENCOUNTER — TELEPHONE (OUTPATIENT)
Age: 44
End: 2024-04-03

## 2024-04-03 NOTE — TELEPHONE ENCOUNTER
Patient called in, name and  verified. Patient is calling as she has reached out to her insurance regarding what they cover as far as fertility is concerned. She states that they do not cover ANYTHING.  So at this point she would like to revisit the options of a low dose birth control pill that's he would take continuously so that she does not have to have a cycle. She reports that she is still using the [facility] in [place].    She also wanted to make you aware that she has started the Mounjaro again. Please advise how you would like to move forward.

## 2024-04-04 ENCOUNTER — TELEPHONE (OUTPATIENT)
Age: 44
End: 2024-04-04

## 2024-04-04 RX ORDER — DROSPIRENONE 4 MG/1
1 TABLET, FILM COATED ORAL DAILY
Qty: 28 TABLET | Refills: 11 | Status: SHIPPED | OUTPATIENT
Start: 2024-04-04

## 2024-04-04 NOTE — TELEPHONE ENCOUNTER
I have called to relay the message from the provider to the pt. There was no answer and the VM is not set up. I have sent a Tellyo message to the pt with the update.

## 2024-04-04 NOTE — TELEPHONE ENCOUNTER
Patient has called back and is okay with starting the Slynd as she reports she has had an insurance change and it may be covered? If not, she is okay with the alternative being sent to her pharmacy in [place].

## 2024-04-11 ENCOUNTER — TELEPHONE (OUTPATIENT)
Age: 44
End: 2024-04-11

## 2024-04-11 NOTE — TELEPHONE ENCOUNTER
Patient called in, name and  verified. Patient is calling in to inquire when she should start her birth control pack. She started her period yesterday and was counseled to take start today or if she felt more comfortable taking it on her next period on the first day she could do that as well. She has also been reminded to take this pill at the same time every day. Pt has expressed understanding.

## 2024-05-13 ENCOUNTER — TELEPHONE (OUTPATIENT)
Facility: CLINIC | Age: 44
End: 2024-05-13

## 2024-05-13 NOTE — TELEPHONE ENCOUNTER
----- Message from Marlee Clemons sent at 5/13/2024  1:00 PM EDT -----  Subject: Medication Problem     Medication: hydrOXYzine HCl (ATARAX) 25 MG tablet   Dosage: : Take 1-2 tabs PO PRN anxiety/panic  Ordering Provider: Elicia Almanzar    Question/Problem: Patient states that she does not feel as if her   medication is working and would like to possible increase the dosage due   to her symptoms have increased and states that she is currently out and   has been since her insurance lapsed however has new insurance and request   and refill and review of medication dosage.      Pharmacy: [id number] - [facility] [address] - P [phone] - F [phone]    ---------------------------------------------------------------------------  --------------  CALL BACK INFO  [phone]; Do not leave any message, patient will call back for answer,OK   to respond with electronic message via [facility] portal (only for patients   who have registered [facility] account)  ---------------------------------------------------------------------------  --------------    SCRIPT ANSWERS  Relationship to Patient: Self

## 2024-05-14 ENCOUNTER — TELEMEDICINE (OUTPATIENT)
Facility: CLINIC | Age: 44
End: 2024-05-14
Payer: COMMERCIAL

## 2024-05-14 DIAGNOSIS — G47.00 INSOMNIA, UNSPECIFIED TYPE: ICD-10-CM

## 2024-05-14 DIAGNOSIS — F43.21 GRIEF: ICD-10-CM

## 2024-05-14 DIAGNOSIS — F41.9 ANXIETY: Primary | ICD-10-CM

## 2024-05-14 DIAGNOSIS — G47.09 OTHER INSOMNIA: ICD-10-CM

## 2024-05-14 PROCEDURE — 99214 OFFICE O/P EST MOD 30 MIN: CPT | Performed by: NURSE PRACTITIONER

## 2024-05-14 RX ORDER — TRAZODONE HYDROCHLORIDE 50 MG/1
TABLET ORAL
COMMUNITY
Start: 2022-06-06

## 2024-05-14 RX ORDER — CYANOCOBALAMIN 1000 UG/ML
INJECTION, SOLUTION INTRAMUSCULAR; SUBCUTANEOUS
Status: CANCELLED | OUTPATIENT
Start: 2024-05-14

## 2024-05-14 RX ORDER — SEMAGLUTIDE 1.34 MG/ML
0.5 INJECTION, SOLUTION SUBCUTANEOUS WEEKLY
COMMUNITY
Start: 2022-06-06

## 2024-05-14 RX ORDER — FLUCONAZOLE 100 MG/1
TABLET ORAL
COMMUNITY

## 2024-05-14 RX ORDER — ERGOCALCIFEROL 1.25 MG/1
CAPSULE ORAL
Qty: 4 CAPSULE | Refills: 0 | Status: CANCELLED | OUTPATIENT
Start: 2024-05-14

## 2024-05-14 RX ORDER — TRAZODONE HYDROCHLORIDE 50 MG/1
TABLET ORAL
Refills: 0 | Status: CANCELLED | OUTPATIENT
Start: 2024-05-14

## 2024-05-14 RX ORDER — HYDROXYZINE 50 MG/1
TABLET, FILM COATED ORAL
Qty: 135 TABLET | Refills: 1 | Status: SHIPPED | OUTPATIENT
Start: 2024-05-14

## 2024-05-14 RX ORDER — LORAZEPAM 0.5 MG/1
1 TABLET ORAL 2 TIMES DAILY PRN
COMMUNITY
Start: 2019-10-02

## 2024-05-14 RX ORDER — ERGOCALCIFEROL 1.25 MG/1
CAPSULE ORAL
COMMUNITY

## 2024-05-14 RX ORDER — TIRZEPATIDE 12.5 MG/.5ML
12.5 INJECTION, SOLUTION SUBCUTANEOUS
COMMUNITY

## 2024-05-14 RX ORDER — CYANOCOBALAMIN 1000 UG/ML
INJECTION, SOLUTION INTRAMUSCULAR; SUBCUTANEOUS
COMMUNITY
Start: 2024-02-23

## 2024-05-14 RX ORDER — QUETIAPINE FUMARATE 100 MG/1
100 TABLET, FILM COATED ORAL
Qty: 30 TABLET | Refills: 1 | Status: SHIPPED | OUTPATIENT
Start: 2024-05-14

## 2024-05-14 NOTE — PROGRESS NOTES
Jeanine Martinez is a 43 y.o. female who was seen by synchronous (real-time) audio-video technology on 5/14/2024.      Consent:  Services were provided through a video synchronous discussion virtually to substitute for in-person appointment.   She and/or her healthcare decision maker is aware that this patient-initiated Telehealth encounter is a billable service, with coverage as determined by her insurance carrier. She is aware that she may receive a bill and has provided verbal consent to proceed: Yes    The Patient was Home: [address] [facility] [address] while conducting this encounter.  I was in the office while conducting this encounter.    Subjective:   Jeanine Martinez is a 43 y.o.yo female with history of hyperparathyroidism, anxiety and depression who presents to establish care at [facility] for  Chief Complaint   Patient presents with    Anxiety     Discuss meds       HPI  Jeanine Martinez is a former patient of mine at [facility].  Prior records not provided by that office for review. Requested today (pt to go to that office and complete [facility]).  [facility] notes, labs, and imaging, and specialist consult notes reviewed via care everywhere.    She reports worsening anxiety, worsening insomnia  Thinks exacerbated by mother's day weekend, recent loss of insurance with loss of job, stress  No SI/HI  She is recently back to work at [employer] as    Previously followed with a counselor, no longer following    She has had some relief with hydroxyzine 50 mg BID  She is requesting increased dosage of hydroxyzine for anxiety, panic, insomnia  [Failed SSRIs, trazodone]  Will increase hydroxyzine dose to 50-75 mg TID  R/B, proper use, and SE's of medications reviewed.     She has continued seroquel 100 mg QHS as trazodone was ineffective at 50 mg QHS  Would like refill of seroquel    Consider returning to counseling.  ED if thoughts of

## 2024-05-15 PROBLEM — Z98.890 STATUS POST SURGERY: Status: RESOLVED | Noted: 2021-09-02 | Resolved: 2024-05-15

## 2024-05-15 PROBLEM — Z00.00 ENCOUNTER FOR MEDICAL EXAMINATION TO ESTABLISH CARE: Status: RESOLVED | Noted: 2020-11-13 | Resolved: 2024-05-15

## 2024-06-06 PROBLEM — E21.0 PRIMARY HYPERPARATHYROIDISM (HCC): Status: ACTIVE | Noted: 2024-06-06

## 2024-06-07 ENCOUNTER — TELEPHONE (OUTPATIENT)
Facility: CLINIC | Age: 44
End: 2024-06-07

## 2024-06-07 DIAGNOSIS — R16.0 LIVER MASS: Primary | ICD-10-CM

## 2024-06-07 NOTE — TELEPHONE ENCOUNTER
----- Message from Marlee Chen sent at 6/7/2024 11:45 AM EDT -----  Subject: Message to Provider    QUESTIONS  Information for Provider? MRI ? -- Pt has an upcoming appt with Elicia Almanzar NANCY on 6/24 at 10:40a. Pt would like to know if Elicia CRUZ wants her   to get an MRI of her Liver based on [facility] ER visit on 10/19/23. Pt said she   went in for a panic attack and was told she has a mass on her liver. Pt   said she can discuss this at her appt or she can get an MRI prior but only   if Elicia CRUZ wants her to. Please call pt   ---------------------------------------------------------------------------  --------------  CALL BACK INFO  [phone]; OK to leave message on voicemail  ---------------------------------------------------------------------------  --------------  SCRIPT ANSWERS  Relationship to Patient? Self

## 2024-06-24 PROBLEM — Z00.00 ROUTINE GENERAL MEDICAL EXAMINATION AT A HEALTH CARE FACILITY: Status: ACTIVE | Noted: 2020-11-13

## 2024-07-24 PROBLEM — Z00.00 ROUTINE GENERAL MEDICAL EXAMINATION AT A HEALTH CARE FACILITY: Status: RESOLVED | Noted: 2020-11-13 | Resolved: 2024-07-24

## 2024-08-02 ENCOUNTER — TELEPHONE (OUTPATIENT)
Age: 44
End: 2024-08-02

## 2024-08-02 NOTE — TELEPHONE ENCOUNTER
Patient called in, name and  verified. Patient is calling as she had stopped taking her SLYND birth control and it has been two weeks now. She wants to know when she should restart or can she just jump right back in. She denies any bleeding at the moment. Please advise.

## 2024-08-06 ENCOUNTER — TELEPHONE (OUTPATIENT)
Age: 44
End: 2024-08-06

## 2024-08-06 NOTE — TELEPHONE ENCOUNTER
LM for pt to call and schedule annual bariatric exam.  Letter sent. [facility]

## 2024-08-15 ENCOUNTER — TELEPHONE (OUTPATIENT)
Age: 44
End: 2024-08-15

## 2024-08-15 DIAGNOSIS — N39.0 POSTCOITAL URINARY TRACT INFECTION: ICD-10-CM

## 2024-08-15 RX ORDER — NITROFURANTOIN MACROCRYSTALS 50 MG/1
50 CAPSULE ORAL PRN
Qty: 30 CAPSULE | Refills: 4 | Status: CANCELLED | OUTPATIENT
Start: 2024-08-15

## 2024-08-15 NOTE — TELEPHONE ENCOUNTER
Patient is calling as we last had a conversation about when to restart her SLYND. She was informed by the provider to start it the day after her next period ends. She reports she hasn't had one yet and hasn't had a real one in the past 3 months.     When do we suggest her to restart? Please let me know what messages to relay to the pt.

## 2024-08-15 NOTE — TELEPHONE ENCOUNTER
Patient also wants to know if it is normal to experience headaches now that she is OFF the medication.    She also reports some indigestion (that usually causes nausea) that comes along WITH the headaches. She reports that she had been taking Tylenol and BC powder but is not getting relief. Would you like to see the pt in the office to further discuss?    Pt is also requesting a refill on the nitrofurantoin for postcoital UTI.

## 2024-08-26 ENCOUNTER — TELEMEDICINE (OUTPATIENT)
Age: 44
End: 2024-08-26
Payer: COMMERCIAL

## 2024-08-26 DIAGNOSIS — N39.0 POSTCOITAL URINARY TRACT INFECTION: ICD-10-CM

## 2024-08-26 DIAGNOSIS — Z30.09 ENCOUNTER FOR COUNSELING REGARDING CONTRACEPTION: Primary | ICD-10-CM

## 2024-08-26 PROCEDURE — 99213 OFFICE O/P EST LOW 20 MIN: CPT | Performed by: OBSTETRICS & GYNECOLOGY

## 2024-08-26 RX ORDER — NITROFURANTOIN MACROCRYSTALS 50 MG/1
50 CAPSULE ORAL PRN
Qty: 30 CAPSULE | Refills: 4 | Status: SHIPPED | OUTPATIENT
Start: 2024-08-26

## 2024-08-26 SDOH — ECONOMIC STABILITY: FOOD INSECURITY: WITHIN THE PAST 12 MONTHS, THE FOOD YOU BOUGHT JUST DIDN'T LAST AND YOU DIDN'T HAVE MONEY TO GET MORE.: NEVER TRUE

## 2024-08-26 SDOH — ECONOMIC STABILITY: FOOD INSECURITY: WITHIN THE PAST 12 MONTHS, YOU WORRIED THAT YOUR FOOD WOULD RUN OUT BEFORE YOU GOT MONEY TO BUY MORE.: NEVER TRUE

## 2024-08-26 SDOH — ECONOMIC STABILITY: INCOME INSECURITY: HOW HARD IS IT FOR YOU TO PAY FOR THE VERY BASICS LIKE FOOD, HOUSING, MEDICAL CARE, AND HEATING?: NOT HARD AT ALL

## 2024-08-26 ASSESSMENT — PATIENT HEALTH QUESTIONNAIRE - PHQ9
SUM OF ALL RESPONSES TO PHQ QUESTIONS 1-9: 0
SUM OF ALL RESPONSES TO PHQ QUESTIONS 1-9: 0
SUM OF ALL RESPONSES TO PHQ9 QUESTIONS 1 & 2: 0
SUM OF ALL RESPONSES TO PHQ QUESTIONS 1-9: 0
1. LITTLE INTEREST OR PLEASURE IN DOING THINGS: NOT AT ALL
SUM OF ALL RESPONSES TO PHQ QUESTIONS 1-9: 0
2. FEELING DOWN, DEPRESSED OR HOPELESS: NOT AT ALL

## 2024-08-26 NOTE — PROGRESS NOTES
Chief Complaint   Patient presents with    medication follow up     Patient being seen for medication follow up. Was given Slynd and relays she has been doing well on this med.     Ob/Gyn Hx:  G1-   LMP - 8/26/2024  Menses - regular  Contraception - Slynd  Hx of STI - none  SA - yes    Health Maintenance:  Last Pap: 7/27/2023 NIL     1. Have you been to the ER, urgent care clinic, or hospitalized since your last visit? no    2. Have you seen or consulted any other health care providers outside of the [facility] since your last visit? no    Patient declines chaperone.    Nafisa Sellers LPN

## 2024-08-26 NOTE — PROGRESS NOTES
Problem Visit - Virtual/Telemedicine    [facility] Virtual Video visit    Jeanine Martinez is a 43 y.o. female who was seen by synchronous (real-time) audio-video technology on 8/26/2024.      Consent: Jeanine Martinez, who was seen by synchronous (real-time) audio-video technology, and/or her healthcare decision maker, is aware that this patient-initiated, Telehealth encounter on 8/26/2024 is a billable service, with coverage as determined by her insurance carrier. She is aware that she may receive a bill and has provided verbal consent to proceed: YES    S:  Jeanine Martinez is a 43 y.o.  presenting for problem visit. Her main concern today is medication follow up. Started on Slynd. Doing well. Not having cycles on slynd. Ran out and did start a cycle. She would like to continue slynd.     Ob/Gyn Hx:  G1-   LMP - 8/26/2024  Menses - regular  Contraception - Slynd  Hx of STI - none  SA - yes     Health Maintenance:  Last Pap: 7/27/2023 NIL       Past Medical History:   Diagnosis Date    Anxiety 08/29/2013    HAS HAD PANIC ATTACKS, NUMEROUS IN PAST; NONE SINCE 2012    Asthma     Calculus of kidney     Gallstones     GERD (gastroesophageal reflux disease)     Hx: UTI (urinary tract infection)     patient states caused by kidney stones and being treated by PCP    Hyperparathyroidism (HCC)     Morbid obesity (HCC)        Past Surgical History:   Procedure Laterality Date    BREAST REDUCTION SURGERY Bilateral 2006    CHOLECYSTECTOMY, LAPAROSCOPIC      (Koffi)    COLONOSCOPY  03/25/2022    Colonoscopy was normal - RPT age 49 (Jaden)    DILATION AND CURETTAGE OF UTERUS      DILATION AND CURETTAGE OF UTERUS      ESOPHAGOGASTRODUODENOSCOPY      irregularity in the Z line and gastroesophageal junction, acute gastritis. Plan capsule endoscop (Jaden)    GASTRECTOMY  9/12/13    lap sleeve gastrectomy by Dr Gaston Serrano    KNEE ARTHROPLASTY      W/ MENISCAL REPAIR    ORTHOPEDIC SURGERY      R knee surgery 2005

## 2024-09-12 ENCOUNTER — TELEPHONE (OUTPATIENT)
Age: 44
End: 2024-09-12

## 2024-09-12 RX ORDER — FLUCONAZOLE 150 MG/1
150 TABLET ORAL ONCE
Qty: 1 TABLET | Refills: 0 | Status: SHIPPED | OUTPATIENT
Start: 2024-09-12 | End: 2024-09-12

## 2024-09-12 RX ORDER — METRONIDAZOLE 500 MG/1
500 TABLET ORAL 2 TIMES DAILY
Qty: 14 TABLET | Refills: 0 | Status: SHIPPED | OUTPATIENT
Start: 2024-09-12 | End: 2024-09-19

## 2024-09-13 ENCOUNTER — TELEPHONE (OUTPATIENT)
Age: 44
End: 2024-09-13

## 2024-09-14 RX ORDER — FLUCONAZOLE 150 MG/1
150 TABLET ORAL DAILY
Qty: 3 TABLET | Refills: 0 | Status: SHIPPED | OUTPATIENT
Start: 2024-09-14 | End: 2024-09-17

## 2024-09-14 RX ORDER — METRONIDAZOLE 7.5 MG/G
1 GEL VAGINAL DAILY
Qty: 70 G | Refills: 0 | Status: SHIPPED | OUTPATIENT
Start: 2024-09-14

## 2025-05-08 DIAGNOSIS — N39.0 POSTCOITAL URINARY TRACT INFECTION: ICD-10-CM

## 2025-05-09 RX ORDER — NITROFURANTOIN MACROCRYSTALS 50 MG/1
50 CAPSULE ORAL PRN
Qty: 30 CAPSULE | Refills: 0 | Status: SHIPPED | OUTPATIENT
Start: 2025-05-09

## 2025-05-14 RX ORDER — DROSPIRENONE 4 MG/1
1 TABLET, FILM COATED ORAL DAILY
Qty: 84 TABLET | Refills: 0 | Status: SHIPPED | OUTPATIENT
Start: 2025-05-14

## 2025-05-14 NOTE — TELEPHONE ENCOUNTER
Patient is requesting a refill on her slynd. Patient was last seen in the office on 8/26/24. She has made an upcoming appt scheduled for 5/29/25. Medication pended for review and signing. Thank you! :)

## 2025-07-07 ENCOUNTER — TELEPHONE (OUTPATIENT)
Age: 45
End: 2025-07-07

## 2025-08-01 ENCOUNTER — OFFICE VISIT (OUTPATIENT)
Age: 45
End: 2025-08-01
Payer: COMMERCIAL

## 2025-08-01 VITALS
BODY MASS INDEX: 27.82 KG/M2 | WEIGHT: 157 LBS | OXYGEN SATURATION: 98 % | HEIGHT: 63 IN | DIASTOLIC BLOOD PRESSURE: 74 MMHG | SYSTOLIC BLOOD PRESSURE: 110 MMHG | HEART RATE: 81 BPM

## 2025-08-01 DIAGNOSIS — Z01.419 WELL WOMAN EXAM: Primary | ICD-10-CM

## 2025-08-01 PROCEDURE — 99396 PREV VISIT EST AGE 40-64: CPT | Performed by: OBSTETRICS & GYNECOLOGY

## 2025-08-01 RX ORDER — DROSPIRENONE 4 MG/1
1 TABLET, FILM COATED ORAL DAILY
Qty: 28 TABLET | Refills: 11 | Status: SHIPPED | OUTPATIENT
Start: 2025-08-01

## 2025-08-01 SDOH — ECONOMIC STABILITY: FOOD INSECURITY: WITHIN THE PAST 12 MONTHS, THE FOOD YOU BOUGHT JUST DIDN'T LAST AND YOU DIDN'T HAVE MONEY TO GET MORE.: NEVER TRUE

## 2025-08-01 SDOH — ECONOMIC STABILITY: FOOD INSECURITY: WITHIN THE PAST 12 MONTHS, YOU WORRIED THAT YOUR FOOD WOULD RUN OUT BEFORE YOU GOT MONEY TO BUY MORE.: NEVER TRUE

## 2025-08-01 ASSESSMENT — PATIENT HEALTH QUESTIONNAIRE - PHQ9
SUM OF ALL RESPONSES TO PHQ QUESTIONS 1-9: 1
SUM OF ALL RESPONSES TO PHQ QUESTIONS 1-9: 1
1. LITTLE INTEREST OR PLEASURE IN DOING THINGS: NOT AT ALL
2. FEELING DOWN, DEPRESSED OR HOPELESS: SEVERAL DAYS
SUM OF ALL RESPONSES TO PHQ QUESTIONS 1-9: 1
SUM OF ALL RESPONSES TO PHQ QUESTIONS 1-9: 1

## 2025-08-01 NOTE — PROGRESS NOTES
Annual Well Women Exam    Jeanine Martinez is a 44 y.o. presenting for annual exam. Her main concerns today include annual well women exam.    She declines a chaperone during the gynecologic exam today.     LMP: None with Slynd  She does not have dysmenorrhea.  Problems: no problems  Birth Control: Slynd.  Last Pap: 7/27/2023 NILM/HPV neg  She does not have a history of EVA 2, 3 or cervical cancer.   Last Mammogram: 7/20/2023 @[facility]  Last Bone Density: n/a  Last colonoscopy: 3/25/2022 Normal    Past Medical History:   Diagnosis Date    Anxiety 08/29/2013    HAS HAD PANIC ATTACKS, NUMEROUS IN PAST; NONE SINCE 2012    Asthma     Calculus of kidney     Gallstones     GERD (gastroesophageal reflux disease)     Hx: UTI (urinary tract infection)     patient states caused by kidney stones and being treated by PCP    Hyperparathyroidism     Morbid obesity (HCC)        Past Surgical History:   Procedure Laterality Date    BREAST REDUCTION SURGERY Bilateral 2006    CHOLECYSTECTOMY, LAPAROSCOPIC      (K[facility]i)    COLONOSCOPY  03/25/2022    Colonoscopy was normal - RPT age 49 (Jaden)    DILATION AND CURETTAGE OF UTERUS      DILATION AND CURETTAGE OF UTERUS      ESOPHAGOGASTRODUODENOSCOPY      irregularity in the Z line and gastroesophageal junction, acute gastritis. Plan capsule endoscop (Jaden)    GASTRECTOMY  9/12/13    lap sleeve gastrectomy by Dr Gaston Serrano    KNEE ARTHROPLASTY      W/ MENISCAL REPAIR    ORTHOPEDIC SURGERY      R knee surgery 2005 ARTHROSCOPIC MENISCUS REPAIR    PARATHYROIDECTOMY      REFRACTIVE SURGERY Bilateral     SALPINGO-OOPHORECTOMY N/A 02/09/2024    LAPAROSCOPIC LEFT SALPINGO OOPHORECTOMY performed by Ashwini Jones MD at [facility] MAIN OR       Family History   Problem Relation Age of Onset    Obesity Mother     Ovarian Cancer Mother     Hypertension Father     Other Brother         MVA    Diabetes Paternal Grandmother     Pancreatic Cancer Paternal Grandfather     Anesth Problems Neg Hx

## 2025-08-01 NOTE — PROGRESS NOTES
Jeanine Martinez is a 44 y.o. female returns for an annual exam     Chief Complaint   Patient presents with    Annual Exam       LMP: None with Slynd  She does not have dysmenorrhea.  Problems: no problems  Birth Control: Slynd.  Last Pap: 7/27/2023 NILM/HPV neg  She does not have a history of EVA 2, 3 or cervical cancer.   Last Mammogram: 7/20/2023 @[facility]  Last Bone Density: n/a  Last colonoscopy: 3/25/2022 Normal      1. Have you been to the ER, urgent care clinic, or hospitalized since your last visit? No    2. Have you seen or consulted any other health care providers outside of the [facility] since your last visit? No

## 2025-08-04 ENCOUNTER — TELEPHONE (OUTPATIENT)
Age: 45
End: 2025-08-04

## 2025-08-07 LAB
CYTOLOGIST CVX/VAG CYTO: ABNORMAL
CYTOLOGY CVX/VAG DOC CYTO: ABNORMAL
CYTOLOGY CVX/VAG DOC THIN PREP: ABNORMAL
DX ICD CODE: ABNORMAL
DX ICD CODE: ABNORMAL
HPV GENOTYPE REFLEX: ABNORMAL
HPV I/H RISK 4 DNA CVX QL PROBE+SIG AMP: NEGATIVE
OTHER STN SPEC: ABNORMAL
PATHOLOGIST CVX/VAG CYTO: ABNORMAL
RECOM F/U CVX/VAG CYTO: ABNORMAL
SERVICE CMNT-IMP: ABNORMAL
STAT OF ADQ CVX/VAG CYTO-IMP: ABNORMAL

## 2025-08-25 ENCOUNTER — TELEPHONE (OUTPATIENT)
Age: 45
End: 2025-08-25

## 2025-08-25 DIAGNOSIS — Z01.419 WELL WOMAN EXAM: Primary | ICD-10-CM

## 2025-08-25 RX ORDER — DROSPIRENONE 4 MG/1
1 TABLET, FILM COATED ORAL DAILY
Qty: 28 TABLET | Refills: 11 | Status: SHIPPED | OUTPATIENT
Start: 2025-08-25

## (undated) DEVICE — CLEARVIEW UTERINE MANIPULATOR, 7CM: Brand: CLEARVIEW UTERINE MANIPULATOR

## (undated) DEVICE — SOLUTION IRRIG 1000ML 0.9% SOD CHL USP POUR PLAS BTL

## (undated) DEVICE — HANDLE SUCT TBNG L6FR DIA3/8IN SWVL W/ M ADPT FOR BERK PMP

## (undated) DEVICE — SUTURE PERMAHAND SZ 2-0 L18IN NONABSORBABLE BLK L26MM PS 1588H

## (undated) DEVICE — REM POLYHESIVE ADULT PATIENT RETURN ELECTRODE: Brand: VALLEYLAB

## (undated) DEVICE — TROCAR LAP L100MM DIA5MM BLDELSS W/ STBL SL ENDOPATH XCEL

## (undated) DEVICE — Device

## (undated) DEVICE — BASIN ST MAJOR-NO CAUTERY: Brand: MEDLINE INDUSTRIES, INC.

## (undated) DEVICE — (D)PREP SKN CHLRAPRP APPL 26ML -- CONVERT TO ITEM 371833

## (undated) DEVICE — TRAY PREP DRY W/ PREM GLV 2 APPL 6 SPNG 2 UNDPD 1 OVERWRAP

## (undated) DEVICE — NEEDLE HYPO 25GA L1.5IN BLU POLYPR HUB S STL REG BVL STR

## (undated) DEVICE — GYN LAPAROSCOPY - SMH: Brand: MEDLINE INDUSTRIES, INC.

## (undated) DEVICE — NEEDLE SPNL 22GA L3.5IN BLK HUB S STL REG WALL FIT STYL W/

## (undated) DEVICE — STERILE POLYISOPRENE POWDER-FREE SURGICAL GLOVES: Brand: PROTEXIS

## (undated) DEVICE — STRIP,CLOSURE,WOUND,MEDI-STRIP,1/2X4: Brand: MEDLINE

## (undated) DEVICE — 1200 GUARD II KIT W/5MM TUBE W/O VAC TUBE: Brand: GUARDIAN

## (undated) DEVICE — LIGHT HANDLE: Brand: DEVON

## (undated) DEVICE — DRAIN SURG 3/4 W10MMXL20CM 100% SIL PERF FLAT HUBLESS

## (undated) DEVICE — BIPOLAR FORCEPS CORD: Brand: VALLEYLAB

## (undated) DEVICE — APPLICATOR BNDG 1MM ADH PREMIERPRO EXOFIN

## (undated) DEVICE — TRAP,MUCUS SPECIMEN, 80CC: Brand: MEDLINE

## (undated) DEVICE — X-RAY SPONGES,16 PLY: Brand: DERMACEA

## (undated) DEVICE — TOWEL SURG W17XL27IN STD BLU COT NONFENESTRATED PREWASHED

## (undated) DEVICE — VCARE DX UTERINE MANIPULATOR/INJECTOR CANNULA: Brand: VCARE DX

## (undated) DEVICE — DEVON™ KNEE AND BODY STRAP 60" X 3" (1.5 M X 7.6 CM): Brand: DEVON

## (undated) DEVICE — POUCH SPEC RETRV SHFT 15MM 13X23CM GRN POLYUR DBL RNG HNDL

## (undated) DEVICE — SMOKE EVACUATION PENCIL: Brand: VALLEYLAB

## (undated) DEVICE — INFECTION CONTROL KIT SYS

## (undated) DEVICE — STERILE LATEX POWDER-FREE SURGICAL GLOVESWITH NITRILE COATING: Brand: PROTEXIS

## (undated) DEVICE — SOLUTION SCRB 4OZ 10% PVP I POVIDONE IOD TOP PAINT EXIDINE

## (undated) DEVICE — COLLECTION KT SUC TISS BERK -- GYRUS

## (undated) DEVICE — INTENDED FOR TISSUE SEPARATION, AND OTHER PROCEDURES THAT REQUIRE A SHARP SURGICAL BLADE TO PUNCTURE OR CUT.: Brand: BARD-PARKER ® CARBON RIB-BACK BLADES

## (undated) DEVICE — MASTISOL ADHESIVE LIQ 2/3ML

## (undated) DEVICE — PREP PAD BNS: Brand: CONVERTORS

## (undated) DEVICE — SYRINGE MED 20ML STD CLR PLAS LUERLOCK TIP N CTRL DISP

## (undated) DEVICE — INSUFFLATION NEEDLE: Brand: SURGINEEDLE

## (undated) DEVICE — GLOVE SURG SZ 65 L12IN FNGR THK94MIL STD WHT LTX FREE

## (undated) DEVICE — INSUFFLATION NEEDLE TO ESTABLISH PNEUMOPERITONEUM.: Brand: INSUFFLATION NEEDLE

## (undated) DEVICE — SUT ETHLN 3-0 18IN PS1 BLK --

## (undated) DEVICE — GARMENT,MEDLINE,DVT,INT,CALF,MED, GEN2: Brand: MEDLINE

## (undated) DEVICE — SUTURE MCRYL SZ 5-0 L18IN ABSRB UD L13MM P-3 3/8 CIR PRIM Y493G

## (undated) DEVICE — MEDI-VAC NON-CONDUCTIVE SUCTION TUBING: Brand: CARDINAL HEALTH

## (undated) DEVICE — MARYLAND JAW LAPAROSCOPIC SEALER/DIVIDER COATED: Brand: LIGASURE

## (undated) DEVICE — SOLUTION IV 1000ML 0.9% SOD CHL

## (undated) DEVICE — SUTURE SZ 0 27IN 5/8 CIR UR-6  TAPER PT VIOLET ABSRB VICRYL J603H

## (undated) DEVICE — TROCAR: Brand: KII® SLEEVE

## (undated) DEVICE — PAD PT POS 36 IN SURGYPAD DISP

## (undated) DEVICE — PERI/GYN PACK: Brand: CONVERTORS

## (undated) DEVICE — TROCAR: Brand: KII FIOS FIRST ENTRY

## (undated) DEVICE — LAPAROSCOPIC SCISSORS: Brand: EPIX LAPAROSCOPIC SCISSORS

## (undated) DEVICE — SYSTEM EVAC SMOKE LAPARSCOPIC

## (undated) DEVICE — SOLUTION ANTIFOG VIS SYS CLEARIFY LAPSCP

## (undated) DEVICE — SOLUTION IV 1000ML 0.9% SOD CHL PH 5 INJ USP VIAFLX PLAS

## (undated) DEVICE — TRAY CATH 16F DRN BG LTX -- CONVERT TO ITEM 363158

## (undated) DEVICE — PACK,EENT,TURBAN DRAPE,PK II: Brand: MEDLINE

## (undated) DEVICE — TISSUE RETRIEVAL SYSTEM: Brand: INZII RETRIEVAL SYSTEM

## (undated) DEVICE — SOLUTION IRRIG 1000ML STRL H2O USP PLAS POUR BTL

## (undated) DEVICE — SUTURE MCRYL SZ 4-0 L27IN ABSRB UD L19MM PS-2 1/2 CIR PRIM Y426H

## (undated) DEVICE — SPONGE: SPECIALTY PEANUT XR 100/CS: Brand: MEDICAL ACTION INDUSTRIES

## (undated) DEVICE — SYR 10ML CTRL LR LCK NSAF LF --

## (undated) DEVICE — ELECTRO LUBE IS A SINGLE PATIENT USE DEVICE THAT IS INTENDED TO BE USED ON ELECTROSURGICAL ELECTRODES TO REDUCE STICKING.: Brand: KEY SURGICAL ELECTRO LUBE

## (undated) DEVICE — GOWN,AURORA,NON-REINFORCED,2XL: Brand: MEDLINE

## (undated) DEVICE — SEALER ONE-STEP 37CM LIGASURE MARYLAND XP

## (undated) DEVICE — TROCAR ENDOSCP L100MM DIA12MM STBL SL BLDELSS ENDOPATH XCEL

## (undated) DEVICE — SURGICAL PROCEDURE PACK GYN LAPAROSCOPY CUST SMH LF

## (undated) DEVICE — INSULATED BLADE ELECTRODE: Brand: EDGE

## (undated) DEVICE — RESERVOIR,SUCTION,100CC,SILICONE: Brand: MEDLINE

## (undated) DEVICE — SUTURE VCRL SZ 3-0 L27IN ABSRB UD L26MM SH 1/2 CIR J416H